# Patient Record
Sex: MALE | Race: WHITE | NOT HISPANIC OR LATINO | Employment: OTHER | URBAN - METROPOLITAN AREA
[De-identification: names, ages, dates, MRNs, and addresses within clinical notes are randomized per-mention and may not be internally consistent; named-entity substitution may affect disease eponyms.]

---

## 2019-05-21 ENCOUNTER — PATIENT OUTREACH (OUTPATIENT)
Dept: ADMINISTRATIVE | Facility: HOSPITAL | Age: 59
End: 2019-05-21

## 2019-05-21 NOTE — PROGRESS NOTES
Immunizations reviewed. Legacy reviewed. Attempted to contact patient. Left message on machine for return call.  Pre-visit chart review completed.

## 2019-05-28 ENCOUNTER — TELEPHONE (OUTPATIENT)
Dept: ADMINISTRATIVE | Facility: HOSPITAL | Age: 59
End: 2019-05-28

## 2019-05-28 DIAGNOSIS — Z12.5 SCREENING FOR MALIGNANT NEOPLASM OF PROSTATE: ICD-10-CM

## 2019-05-28 DIAGNOSIS — Z11.59 NEED FOR HEPATITIS C SCREENING TEST: ICD-10-CM

## 2019-05-28 DIAGNOSIS — Z13.29 SCREENING FOR THYROID DISORDER: ICD-10-CM

## 2019-05-28 DIAGNOSIS — R79.9 ABNORMAL BLOOD CHEMISTRY: ICD-10-CM

## 2019-05-28 DIAGNOSIS — F32.A DEPRESSION, UNSPECIFIED DEPRESSION TYPE: ICD-10-CM

## 2019-05-28 DIAGNOSIS — Z13.220 SCREENING FOR HYPERLIPIDEMIA: ICD-10-CM

## 2019-05-28 NOTE — TELEPHONE ENCOUNTER
Received return call from patient. Discussed overdue HM. Patient advised he had Colonoscopy done 5 years ago and it was normal at [facility]. Patient scheduled for fasting labs 05/29 as patient states he has not had labs done recently. Orders placed for full labs.

## 2019-06-04 ENCOUNTER — CLINICAL SUPPORT (OUTPATIENT)
Dept: PRIMARY CARE CLINIC | Facility: CLINIC | Age: 59
End: 2019-06-04
Payer: COMMERCIAL

## 2019-06-04 ENCOUNTER — OFFICE VISIT (OUTPATIENT)
Dept: PRIMARY CARE CLINIC | Facility: CLINIC | Age: 59
End: 2019-06-04
Payer: COMMERCIAL

## 2019-06-04 VITALS
DIASTOLIC BLOOD PRESSURE: 71 MMHG | OXYGEN SATURATION: 99 % | SYSTOLIC BLOOD PRESSURE: 116 MMHG | BODY MASS INDEX: 26.14 KG/M2 | RESPIRATION RATE: 18 BRPM | HEART RATE: 68 BPM | HEIGHT: 72 IN | WEIGHT: 193 LBS

## 2019-06-04 DIAGNOSIS — Z11.59 NEED FOR HEPATITIS C SCREENING TEST: ICD-10-CM

## 2019-06-04 DIAGNOSIS — I25.10 CORONARY ARTERY DISEASE, ANGINA PRESENCE UNSPECIFIED, UNSPECIFIED VESSEL OR LESION TYPE, UNSPECIFIED WHETHER NATIVE OR TRANSPLANTED HEART: ICD-10-CM

## 2019-06-04 DIAGNOSIS — K08.109 EDENTULOUS: ICD-10-CM

## 2019-06-04 DIAGNOSIS — R55 SYNCOPE, UNSPECIFIED SYNCOPE TYPE: ICD-10-CM

## 2019-06-04 DIAGNOSIS — E03.9 HYPOTHYROIDISM, UNSPECIFIED TYPE: ICD-10-CM

## 2019-06-04 DIAGNOSIS — Z72.0 TOBACCO ABUSE: ICD-10-CM

## 2019-06-04 DIAGNOSIS — L98.9 SKIN LESION: Primary | ICD-10-CM

## 2019-06-04 DIAGNOSIS — Z95.5 HISTORY OF HEART ARTERY STENT: ICD-10-CM

## 2019-06-04 LAB
ALBUMIN SERPL BCP-MCNC: 4.3 G/DL (ref 3.5–5.2)
ALP SERPL-CCNC: 179 U/L (ref 38–126)
ALT SERPL W/O P-5'-P-CCNC: 16 U/L (ref 17–63)
ANION GAP SERPL CALC-SCNC: 10 MMOL/L (ref 8–16)
AST SERPL-CCNC: 22 U/L (ref 15–41)
BASOPHILS # BLD AUTO: 0 K/UL (ref 0–0.2)
BASOPHILS NFR BLD: 0.8 % (ref 0–1.9)
BILIRUB SERPL-MCNC: 0.7 MG/DL (ref 0.3–1.2)
BILIRUB SERPL-MCNC: NORMAL MG/DL
BLOOD URINE, POC: NORMAL
BUN SERPL-MCNC: 23 MG/DL (ref 6–20)
CALCIUM SERPL-MCNC: 8.9 MG/DL (ref 8.6–10)
CHLORIDE SERPL-SCNC: 104 MMOL/L (ref 101–111)
CHOLEST SERPL-MCNC: 217 MG/DL (ref 80–200)
CHOLEST/HDLC SERPL: 2.8 {RATIO} (ref 2–5)
CO2 SERPL-SCNC: 26 MMOL/L (ref 23–29)
COLOR, POC UA: YELLOW
CREAT SERPL-MCNC: 0.8 MG/DL (ref 0.5–1.4)
DIFFERENTIAL METHOD: ABNORMAL
EOSINOPHIL # BLD AUTO: 0.1 K/UL (ref 0–0.5)
EOSINOPHIL NFR BLD: 2.5 % (ref 0–8)
ERYTHROCYTE [DISTWIDTH] IN BLOOD BY AUTOMATED COUNT: 16.6 % (ref 11.5–14.5)
ERYTHROCYTE [SEDIMENTATION RATE] IN BLOOD BY WESTERGREN METHOD: 20 MM/HR (ref 0–10)
EST. GFR  (AFRICAN AMERICAN): >60 ML/MIN/1.73 M^2
EST. GFR  (NON AFRICAN AMERICAN): >60 ML/MIN/1.73 M^2
GLUCOSE SERPL-MCNC: 89 MG/DL (ref 74–118)
GLUCOSE UR QL STRIP: NORMAL
HCT VFR BLD AUTO: 40.7 % (ref 40–54)
HDLC SERPL-MCNC: 77 MG/DL (ref 40–75)
HDLC SERPL: 35.5 % (ref 20–50)
HGB BLD-MCNC: 13.2 G/DL (ref 14–18)
KETONES UR QL STRIP: NORMAL
LDLC SERPL CALC-MCNC: 121 MG/DL
LEUKOCYTE ESTERASE URINE, POC: NORMAL
LYMPHOCYTES # BLD AUTO: 1.3 K/UL (ref 1–4.8)
LYMPHOCYTES NFR BLD: 22.1 % (ref 18–48)
MCH RBC QN AUTO: 29 PG (ref 27–31)
MCHC RBC AUTO-ENTMCNC: 32.5 G/DL (ref 32–36)
MCV RBC AUTO: 89 FL (ref 82–98)
MONOCYTES # BLD AUTO: 0.5 K/UL (ref 0.3–1)
MONOCYTES NFR BLD: 8.9 % (ref 4–15)
NEUTROPHILS # BLD AUTO: 3.9 K/UL (ref 1.8–7.7)
NEUTROPHILS NFR BLD: 65.7 % (ref 38–73)
NITRITE, POC UA: NORMAL
NONHDLC SERPL-MCNC: 140 MG/DL
PH, POC UA: 5
PLATELET # BLD AUTO: 295 K/UL (ref 150–350)
PMV BLD AUTO: 9.9 FL (ref 9.2–12.9)
POTASSIUM SERPL-SCNC: 4.4 MMOL/L (ref 3.5–5.1)
PROT SERPL-MCNC: 8.2 G/DL (ref 6–8.4)
PROTEIN, POC: NORMAL
RBC # BLD AUTO: 4.56 M/UL (ref 4.6–6.2)
SODIUM SERPL-SCNC: 140 MMOL/L (ref 136–145)
SPECIFIC GRAVITY, POC UA: 1.02
T4 FREE SERPL-MCNC: 0.73 NG/DL (ref 0.61–1.12)
TRIGL SERPL-MCNC: 94 MG/DL (ref 30–150)
TSH SERPL DL<=0.005 MIU/L-ACNC: 3.74 UIU/ML (ref 0.45–5.33)
UROBILINOGEN, POC UA: NORMAL
WBC # BLD AUTO: 5.9 K/UL (ref 3.9–12.7)

## 2019-06-04 PROCEDURE — 85651 RBC SED RATE NONAUTOMATED: CPT

## 2019-06-04 PROCEDURE — 3008F BODY MASS INDEX DOCD: CPT | Mod: CPTII,S$GLB,, | Performed by: FAMILY MEDICINE

## 2019-06-04 PROCEDURE — 81002 POCT URINE DIPSTICK WITHOUT MICROSCOPE: ICD-10-PCS | Mod: S$GLB,,, | Performed by: FAMILY MEDICINE

## 2019-06-04 PROCEDURE — 93010 ELECTROCARDIOGRAM REPORT: CPT | Mod: S$GLB,,, | Performed by: INTERNAL MEDICINE

## 2019-06-04 PROCEDURE — 85025 COMPLETE CBC W/AUTO DIFF WBC: CPT

## 2019-06-04 PROCEDURE — 93005 ELECTROCARDIOGRAM TRACING: CPT | Mod: S$GLB,,, | Performed by: FAMILY MEDICINE

## 2019-06-04 PROCEDURE — 99204 OFFICE O/P NEW MOD 45 MIN: CPT | Mod: 25,S$GLB,, | Performed by: FAMILY MEDICINE

## 2019-06-04 PROCEDURE — 86803 HEPATITIS C AB TEST: CPT

## 2019-06-04 PROCEDURE — 80061 LIPID PANEL: CPT

## 2019-06-04 PROCEDURE — 99999 PR PBB SHADOW E&M-EST. PATIENT-LVL IV: CPT | Mod: PBBFAC,,, | Performed by: FAMILY MEDICINE

## 2019-06-04 PROCEDURE — 81002 URINALYSIS NONAUTO W/O SCOPE: CPT | Mod: S$GLB,,, | Performed by: FAMILY MEDICINE

## 2019-06-04 PROCEDURE — 84439 ASSAY OF FREE THYROXINE: CPT

## 2019-06-04 PROCEDURE — 3008F PR BODY MASS INDEX (BMI) DOCUMENTED: ICD-10-PCS | Mod: CPTII,S$GLB,, | Performed by: FAMILY MEDICINE

## 2019-06-04 PROCEDURE — 93010 EKG 12-LEAD: ICD-10-PCS | Mod: S$GLB,,, | Performed by: INTERNAL MEDICINE

## 2019-06-04 PROCEDURE — 99204 PR OFFICE/OUTPT VISIT, NEW, LEVL IV, 45-59 MIN: ICD-10-PCS | Mod: 25,S$GLB,, | Performed by: FAMILY MEDICINE

## 2019-06-04 PROCEDURE — 80053 COMPREHEN METABOLIC PANEL: CPT

## 2019-06-04 PROCEDURE — 84443 ASSAY THYROID STIM HORMONE: CPT

## 2019-06-04 PROCEDURE — 99999 PR PBB SHADOW E&M-EST. PATIENT-LVL IV: ICD-10-PCS | Mod: PBBFAC,,, | Performed by: FAMILY MEDICINE

## 2019-06-04 PROCEDURE — 82306 VITAMIN D 25 HYDROXY: CPT

## 2019-06-04 PROCEDURE — 93005 EKG 12-LEAD: ICD-10-PCS | Mod: S$GLB,,, | Performed by: FAMILY MEDICINE

## 2019-06-04 RX ORDER — FERROUS SULFATE 325(65) MG
TABLET ORAL
Qty: 60 TABLET | Refills: 5 | Status: SHIPPED | OUTPATIENT
Start: 2019-06-04 | End: 2020-05-19 | Stop reason: SDUPTHER

## 2019-06-04 RX ORDER — LEVOTHYROXINE SODIUM 88 UG/1
88 TABLET ORAL
Qty: 30 TABLET | Refills: 5 | Status: SHIPPED | OUTPATIENT
Start: 2019-06-04 | End: 2020-05-19 | Stop reason: SDUPTHER

## 2019-06-04 RX ORDER — CITALOPRAM 20 MG/1
20 TABLET, FILM COATED ORAL DAILY
Qty: 30 TABLET | Refills: 5 | Status: SHIPPED | OUTPATIENT
Start: 2019-06-04 | End: 2019-09-05 | Stop reason: ALTCHOICE

## 2019-06-04 RX ORDER — CITALOPRAM 20 MG/1
20 TABLET, FILM COATED ORAL DAILY
COMMUNITY
Start: 2019-05-28 | End: 2019-06-04 | Stop reason: SDUPTHER

## 2019-06-04 RX ORDER — FERROUS SULFATE 325(65) MG
TABLET ORAL
COMMUNITY
Start: 2018-11-27 | End: 2019-06-04 | Stop reason: SDUPTHER

## 2019-06-04 RX ORDER — LEVOTHYROXINE SODIUM 88 UG/1
88 TABLET ORAL
COMMUNITY
Start: 2018-11-27 | End: 2019-06-04 | Stop reason: SDUPTHER
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
## 2019-06-05 LAB
25(OH)D3+25(OH)D2 SERPL-MCNC: 11 NG/ML (ref 30–96)
HCV AB SERPL QL IA: NEGATIVE

## 2019-06-11 DIAGNOSIS — R79.89 LOW VITAMIN D LEVEL: Primary | ICD-10-CM

## 2019-06-11 RX ORDER — ERGOCALCIFEROL 1.25 MG/1
50000 CAPSULE ORAL
Qty: 12 CAPSULE | Refills: 3 | Status: SHIPPED | OUTPATIENT
Start: 2019-06-11 | End: 2020-07-26

## 2019-06-11 NOTE — TELEPHONE ENCOUNTER
----- Message from Joselito Escamilla MD sent at 6/7/2019  7:49 AM CDT -----  Call tell Vit D low 11 needs be on vit d 50.000 units q week #12 3 refills sed rate 20 within normal limits chemistry alk-phos 179 may need B12 cholesterol 217 better of 180  better of 100 HDL 77 excellent CBCs CMP lipids T4 TSH all basically normal except for the vitamin D alk-phos in cholesterol needs to be on low-fat diet no need for specific treatment of cholesterol at this time due to excellent HDL but when start vitamin-D

## 2019-06-11 NOTE — TELEPHONE ENCOUNTER
Pt states he used to give himself B12 injections in his thigh muscle, so if he needs to start taking it again he can. Also, pended RX. Please advise

## 2019-07-30 ENCOUNTER — OFFICE VISIT (OUTPATIENT)
Dept: PRIMARY CARE CLINIC | Facility: CLINIC | Age: 59
End: 2019-07-30
Payer: COMMERCIAL

## 2019-07-30 VITALS
WEIGHT: 206 LBS | HEART RATE: 76 BPM | DIASTOLIC BLOOD PRESSURE: 79 MMHG | HEIGHT: 72 IN | OXYGEN SATURATION: 99 % | BODY MASS INDEX: 27.9 KG/M2 | TEMPERATURE: 98 F | RESPIRATION RATE: 19 BRPM | SYSTOLIC BLOOD PRESSURE: 121 MMHG

## 2019-07-30 DIAGNOSIS — S40.021A TRAUMATIC ECCHYMOSIS OF RIGHT UPPER ARM, INITIAL ENCOUNTER: ICD-10-CM

## 2019-07-30 DIAGNOSIS — E03.9 HYPOTHYROIDISM, UNSPECIFIED TYPE: ICD-10-CM

## 2019-07-30 DIAGNOSIS — K08.109 EDENTULOUS: Primary | ICD-10-CM

## 2019-07-30 DIAGNOSIS — L98.9 SKIN LESION: ICD-10-CM

## 2019-07-30 DIAGNOSIS — I25.10 CORONARY ARTERY DISEASE, ANGINA PRESENCE UNSPECIFIED, UNSPECIFIED VESSEL OR LESION TYPE, UNSPECIFIED WHETHER NATIVE OR TRANSPLANTED HEART: ICD-10-CM

## 2019-07-30 DIAGNOSIS — S42.034D CLOSED NONDISPLACED FRACTURE OF ACROMIAL END OF RIGHT CLAVICLE WITH ROUTINE HEALING: ICD-10-CM

## 2019-07-30 DIAGNOSIS — Z72.0 TOBACCO ABUSE: ICD-10-CM

## 2019-07-30 PROCEDURE — 99213 OFFICE O/P EST LOW 20 MIN: CPT | Mod: S$GLB,,, | Performed by: FAMILY MEDICINE

## 2019-07-30 PROCEDURE — 99999 PR PBB SHADOW E&M-EST. PATIENT-LVL IV: ICD-10-PCS | Mod: PBBFAC,,, | Performed by: FAMILY MEDICINE

## 2019-07-30 PROCEDURE — 99213 PR OFFICE/OUTPT VISIT, EST, LEVL III, 20-29 MIN: ICD-10-PCS | Mod: S$GLB,,, | Performed by: FAMILY MEDICINE

## 2019-07-30 PROCEDURE — 3008F BODY MASS INDEX DOCD: CPT | Mod: CPTII,S$GLB,, | Performed by: FAMILY MEDICINE

## 2019-07-30 PROCEDURE — 99999 PR PBB SHADOW E&M-EST. PATIENT-LVL IV: CPT | Mod: PBBFAC,,, | Performed by: FAMILY MEDICINE

## 2019-07-30 PROCEDURE — 3008F PR BODY MASS INDEX (BMI) DOCUMENTED: ICD-10-PCS | Mod: CPTII,S$GLB,, | Performed by: FAMILY MEDICINE
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
## 2019-08-01 ENCOUNTER — TELEPHONE (OUTPATIENT)
Dept: ORTHOPEDICS | Facility: CLINIC | Age: 59
End: 2019-08-01

## 2019-08-01 NOTE — TELEPHONE ENCOUNTER
Ortho Referral: [phone]  Appt scheduled tomorrow with MISTI Temple/  Ortho Clinic at  1:30pm with arrival at 1:15pm for R clavicle fracture ED follow up per Dr. Escamilla referral. Pt to maintain sling to RUE until seen in Ortho Clinic and is to obtain imaging disk from ED. SO, Marian,  confirms time and location of appt.

## 2019-08-02 ENCOUNTER — TELEPHONE (OUTPATIENT)
Dept: PRIMARY CARE CLINIC | Facility: CLINIC | Age: 59
End: 2019-08-02

## 2019-08-02 ENCOUNTER — OFFICE VISIT (OUTPATIENT)
Dept: ORTHOPEDICS | Facility: CLINIC | Age: 59
End: 2019-08-02
Payer: COMMERCIAL

## 2019-08-02 ENCOUNTER — HOSPITAL ENCOUNTER (OUTPATIENT)
Dept: RADIOLOGY | Facility: HOSPITAL | Age: 59
Discharge: HOME | End: 2019-08-02
Attending: NURSE PRACTITIONER
Payer: COMMERCIAL

## 2019-08-02 VITALS
SYSTOLIC BLOOD PRESSURE: 137 MMHG | DIASTOLIC BLOOD PRESSURE: 86 MMHG | WEIGHT: 205.94 LBS | HEART RATE: 59 BPM | BODY MASS INDEX: 27.93 KG/M2

## 2019-08-02 DIAGNOSIS — S42.001A CLOSED NONDISPLACED FRACTURE OF RIGHT CLAVICLE, UNSPECIFIED PART OF CLAVICLE, INITIAL ENCOUNTER: Primary | ICD-10-CM

## 2019-08-02 DIAGNOSIS — S42.124A CLOSED NONDISPLACED FRACTURE OF RIGHT ACROMIAL PROCESS, INITIAL ENCOUNTER: ICD-10-CM

## 2019-08-02 DIAGNOSIS — R52 PAIN: ICD-10-CM

## 2019-08-02 DIAGNOSIS — S42.001A CLOSED NONDISPLACED FRACTURE OF RIGHT CLAVICLE, UNSPECIFIED PART OF CLAVICLE, INITIAL ENCOUNTER: ICD-10-CM

## 2019-08-02 PROCEDURE — 3008F BODY MASS INDEX DOCD: CPT | Mod: CPTII,S$GLB,, | Performed by: NURSE PRACTITIONER

## 2019-08-02 PROCEDURE — 73000 X-RAY EXAM OF COLLAR BONE: CPT | Mod: 26,RT,, | Performed by: RADIOLOGY

## 2019-08-02 PROCEDURE — 99214 PR OFFICE/OUTPT VISIT, EST, LEVL IV, 30-39 MIN: ICD-10-PCS | Mod: S$GLB,,, | Performed by: NURSE PRACTITIONER

## 2019-08-02 PROCEDURE — 3008F PR BODY MASS INDEX (BMI) DOCUMENTED: ICD-10-PCS | Mod: CPTII,S$GLB,, | Performed by: NURSE PRACTITIONER

## 2019-08-02 PROCEDURE — 99999 PR PBB SHADOW E&M-EST. PATIENT-LVL III: CPT | Mod: PBBFAC,,, | Performed by: NURSE PRACTITIONER

## 2019-08-02 PROCEDURE — 99214 OFFICE O/P EST MOD 30 MIN: CPT | Mod: S$GLB,,, | Performed by: NURSE PRACTITIONER

## 2019-08-02 PROCEDURE — 99999 PR PBB SHADOW E&M-EST. PATIENT-LVL III: ICD-10-PCS | Mod: PBBFAC,,, | Performed by: NURSE PRACTITIONER

## 2019-08-02 PROCEDURE — 73000 X-RAY EXAM OF COLLAR BONE: CPT | Mod: TC,RT

## 2019-08-02 PROCEDURE — 73030 X-RAY EXAM OF SHOULDER: CPT | Mod: 26,RT,, | Performed by: RADIOLOGY

## 2019-08-02 PROCEDURE — 73030 XR SHOULDER COMPLETE 2 OR MORE VIEWS RIGHT: ICD-10-PCS | Mod: 26,RT,, | Performed by: RADIOLOGY

## 2019-08-02 PROCEDURE — 73030 X-RAY EXAM OF SHOULDER: CPT | Mod: TC,RT

## 2019-08-02 PROCEDURE — 73000 XR CLAVICLE RIGHT: ICD-10-PCS | Mod: 26,RT,, | Performed by: RADIOLOGY

## 2019-08-02 RX ORDER — IBUPROFEN 800 MG/1
800 TABLET ORAL 3 TIMES DAILY PRN
Qty: 30 TABLET | Refills: 0 | Status: SHIPPED | OUTPATIENT
Start: 2019-08-02 | End: 2019-08-23

## 2019-08-02 NOTE — TELEPHONE ENCOUNTER
Spoke to pt girlfriend. She states they can't get in touch with Dr. Douglas office. Told her to try to call again Monday, and if they don't answer we can see about referring the patient elsewhere. Verbalized understanding.

## 2019-08-02 NOTE — TELEPHONE ENCOUNTER
----- Message from Shana Crespo sent at 8/2/2019  4:20 PM CDT -----  Contact: Patient's gf Ms. Fonseca  Type: Needs Medical Advice    Who Called:  Patient  Best Call Back Number:[phone]  Additional Information: Marian is requesting a call back from Lupe in regards to her bf going to see a Neurologist at [place].Please call back and advise.

## 2019-08-02 NOTE — LETTER
August 2, 2019      Joselito Escamilla MD  [address] [place] [address]           [facility]  [address]  [facility] [address]  Phone: [phone]          Patient: Mauricio Molina   MR Number: [id number]   YOB: 1960   Date of Visit: 8/2/2019       Dear Dr. Joselito Escamilla:    Thank you for referring Mauricio Molina to me for evaluation. Attached you will find relevant portions of my assessment and plan of care.    If you have questions, please do not hesitate to call me. I look forward to following Mauricio Molina along with you.    Sincerely,    Samy Jarvis, MISTI    Enclosure  CC:  No Recipients    If you would like to receive this communication electronically, please contact [email or web address] or [phone] to request more information on [employer] access.    For providers and/or their staff who would like to refer a patient to [facility], please contact us through our one-stop-shop provider referral line, [facility], at [phone].    If you feel you have received this communication in error or would no longer like to receive these types of communications, please e-mail [email or web address]
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
## 2019-08-07 ENCOUNTER — TELEPHONE (OUTPATIENT)
Dept: PRIMARY CARE CLINIC | Facility: CLINIC | Age: 59
End: 2019-08-07

## 2019-08-07 NOTE — TELEPHONE ENCOUNTER
----- Message from Gonzalo Gramajo sent at 8/7/2019  3:57 PM CDT -----  Contact: pt's girlfriend Marian  Type: Needs Medical Advice    Who Called:  Marian    Best Call Back Number: [phone]  Additional Information: Wants to let Lupe know that the pt got an appointment with neurologist tomorrow at 1:00pm.

## 2019-08-08 ENCOUNTER — TELEPHONE (OUTPATIENT)
Dept: PRIMARY CARE CLINIC | Facility: CLINIC | Age: 59
End: 2019-08-08

## 2019-08-08 NOTE — TELEPHONE ENCOUNTER
Called patient notified that rx can not be refilled over the phone would need an appointment. Patient states that he seen Samy Jarvis NP and was told that he could not receive another rx for Norco yet. Notified patient we would not be able to give rx yet due to just filling rx on 07/30 states understanding states will also try pain management

## 2019-08-08 NOTE — TELEPHONE ENCOUNTER
----- Message from Lupe Chelimilind sent at 8/8/2019  2:22 PM CDT -----  Pt came from neurologist appt and want to inform Andi that the neurologist ordered him a 72hr ekg test and prescribed him three new meds one of them being cymbalta. Pt wants to know when should he come in for a f/u appt with Andi.

## 2019-08-08 NOTE — TELEPHONE ENCOUNTER
----- Message from Jennyfer Giordano sent at 8/8/2019  3:22 PM CDT -----  Contact: [phone]   Patient requesting a refill on norco.    Patient will be using   [facility]/pharmacy #[id number] - [place] - [address]  [address] [place] [address]  Phone: [phone] Fax: [phone]    Please call patient at [phone].     Thanks!

## 2019-08-08 NOTE — TELEPHONE ENCOUNTER
Spoke with patient in another encounter and notified that per Dr Escamilla last note f/u in 6 weeks states understanding

## 2019-08-23 ENCOUNTER — OFFICE VISIT (OUTPATIENT)
Dept: PRIMARY CARE CLINIC | Facility: CLINIC | Age: 59
End: 2019-08-23
Payer: COMMERCIAL

## 2019-08-23 VITALS
RESPIRATION RATE: 18 BRPM | HEIGHT: 72 IN | BODY MASS INDEX: 28.15 KG/M2 | WEIGHT: 207.81 LBS | SYSTOLIC BLOOD PRESSURE: 108 MMHG | OXYGEN SATURATION: 99 % | DIASTOLIC BLOOD PRESSURE: 75 MMHG | TEMPERATURE: 98 F | HEART RATE: 68 BPM

## 2019-08-23 DIAGNOSIS — S80.02XA CONTUSION OF LEFT KNEE, INITIAL ENCOUNTER: Primary | ICD-10-CM

## 2019-08-23 DIAGNOSIS — S42.034D CLOSED NONDISPLACED FRACTURE OF ACROMIAL END OF RIGHT CLAVICLE WITH ROUTINE HEALING: ICD-10-CM

## 2019-08-23 DIAGNOSIS — H61.22 IMPACTED CERUMEN OF LEFT EAR: ICD-10-CM

## 2019-08-23 PROCEDURE — 3008F PR BODY MASS INDEX (BMI) DOCUMENTED: ICD-10-PCS | Mod: CPTII,S$GLB,, | Performed by: INTERNAL MEDICINE

## 2019-08-23 PROCEDURE — 99999 PR PBB SHADOW E&M-EST. PATIENT-LVL III: CPT | Mod: PBBFAC,,, | Performed by: INTERNAL MEDICINE

## 2019-08-23 PROCEDURE — 99213 OFFICE O/P EST LOW 20 MIN: CPT | Mod: S$GLB,,, | Performed by: INTERNAL MEDICINE

## 2019-08-23 PROCEDURE — 99999 PR PBB SHADOW E&M-EST. PATIENT-LVL III: ICD-10-PCS | Mod: PBBFAC,,, | Performed by: INTERNAL MEDICINE

## 2019-08-23 PROCEDURE — 3008F BODY MASS INDEX DOCD: CPT | Mod: CPTII,S$GLB,, | Performed by: INTERNAL MEDICINE

## 2019-08-23 PROCEDURE — 99213 PR OFFICE/OUTPT VISIT, EST, LEVL III, 20-29 MIN: ICD-10-PCS | Mod: S$GLB,,, | Performed by: INTERNAL MEDICINE

## 2019-08-23 RX ORDER — HYDROCODONE BITARTRATE AND ACETAMINOPHEN 5; 325 MG/1; MG/1
1 TABLET ORAL EVERY 12 HOURS PRN
Qty: 15 TABLET | Refills: 0 | Status: SHIPPED | OUTPATIENT
Start: 2019-08-23 | End: 2019-11-19

## 2019-08-23 RX ORDER — IBUPROFEN 800 MG/1
800 TABLET ORAL 3 TIMES DAILY
Qty: 30 TABLET | Refills: 0 | Status: SHIPPED | OUTPATIENT
Start: 2019-08-23 | End: 2019-09-05 | Stop reason: SDUPTHER

## 2019-08-24 NOTE — PROGRESS NOTES
Subjective:       Patient ID: Mauricio Molina is a 59 y.o. male.    Chief Complaint: Fall (1 week ago) and Knee Pain (left knee pain and swelling since fall)    HPI  patient states the he fell at home when get off from a step landed on the left knee complained of pain and swelling and worsening when ambulating since then also fell about a month ago fracture right clavicle no loss of consciousness no dizziness deny any other injury no short of breath chest pain dyspnea with exertion still smoking a pack a day  Review of Systems    Objective:      Physical Exam   Constitutional: He is oriented to person, place, and time. He appears well-developed and well-nourished. No distress.   HENT:   Head: Normocephalic and atraumatic.   Right Ear: External ear normal.   Nose: Nose normal.   Mouth/Throat: Oropharynx is clear and moist. No oropharyngeal exudate.   Cerumen impaction of left ear canal   Eyes: Pupils are equal, round, and reactive to light. Conjunctivae and EOM are normal. Right eye exhibits no discharge. Left eye exhibits no discharge.   Neck: Normal range of motion. Neck supple. No thyromegaly present.   Cardiovascular: Normal rate, regular rhythm, normal heart sounds and intact distal pulses. Exam reveals no gallop and no friction rub.   No murmur heard.  Pulmonary/Chest: Effort normal and breath sounds normal. No respiratory distress. He has no wheezes. He has no rales. He exhibits no tenderness.   Abdominal: Soft. Bowel sounds are normal. He exhibits no distension. There is no tenderness. There is no rebound and no guarding.   Musculoskeletal: Normal range of motion. He exhibits tenderness (Tenderness with palpation around the kneecap but no obvious swelling erythema or heat). He exhibits no edema or deformity.   Lymphadenopathy:     He has no cervical adenopathy.   Neurological: He is alert and oriented to person, place, and time.   Skin: Skin is warm and dry. Capillary refill takes less than 2 seconds. No rash  noted. No erythema.   Psychiatric: He has a normal mood and affect. Judgment and thought content normal.   Nursing note and vitals reviewed.      Assessment:       1. Contusion of left knee, initial encounter    2. Closed nondisplaced fracture of acromial end of right clavicle with routine healing    3. Impacted cerumen of left ear        Plan:       Contusion of left knee, initial encounter  -     X-Ray Knee 3 View Left; Future; Expected date: 08/23/2019  -     HYDROcodone-acetaminophen (NORCO) 5-325 mg per tablet; Take 1 tablet by mouth every 12 (twelve) hours as needed.  Dispense: 15 tablet; Refill: 0  -     KNEE BRACE FOR HOME USE  -     ibuprofen (ADVIL,MOTRIN) 800 MG tablet; Take 1 tablet (800 mg total) by mouth 3 (three) times daily.  Dispense: 30 tablet; Refill: 0    Closed nondisplaced fracture of acromial end of right clavicle with routine healing    Impacted cerumen of left ear  -     Ear wax removal

## 2019-08-29 ENCOUNTER — TELEPHONE (OUTPATIENT)
Dept: PRIMARY CARE CLINIC | Facility: CLINIC | Age: 59
End: 2019-08-29

## 2019-08-29 NOTE — TELEPHONE ENCOUNTER
----- Message from Rosalina Edwards sent at 8/29/2019  1:57 PM CDT -----  Contact: Patient  Type:  Test Results    Who Called:  Mauricio patient  Name of Test (Lab/Mammo/Etc):  Xray  Date of Test:  08/23/2019  Ordering Provider:  Dr Escamilla  Where the test was performed:   [facility]  Best Call Back Number:  [phone]  Additional Information:  Please call him. Thanks.

## 2019-08-29 NOTE — TELEPHONE ENCOUNTER
----- Message from Ana Padilla sent at 8/29/2019  4:11 PM CDT -----  Contact: self   Patient is returning a phone call.  Who left a message for the patient: Maddie Marti MA  Does patient know what this is regarding:  Xray  Comments:

## 2019-09-05 ENCOUNTER — OFFICE VISIT (OUTPATIENT)
Dept: PRIMARY CARE CLINIC | Facility: CLINIC | Age: 59
End: 2019-09-05
Payer: COMMERCIAL

## 2019-09-05 VITALS
SYSTOLIC BLOOD PRESSURE: 125 MMHG | BODY MASS INDEX: 28.35 KG/M2 | HEIGHT: 72 IN | DIASTOLIC BLOOD PRESSURE: 78 MMHG | WEIGHT: 209.31 LBS | OXYGEN SATURATION: 100 % | HEART RATE: 78 BPM | TEMPERATURE: 98 F | RESPIRATION RATE: 18 BRPM

## 2019-09-05 DIAGNOSIS — S80.02XA CONTUSION OF LEFT KNEE, INITIAL ENCOUNTER: ICD-10-CM

## 2019-09-05 DIAGNOSIS — R55 SYNCOPE, UNSPECIFIED SYNCOPE TYPE: ICD-10-CM

## 2019-09-05 DIAGNOSIS — S86.912D KNEE STRAIN, LEFT, SUBSEQUENT ENCOUNTER: ICD-10-CM

## 2019-09-05 DIAGNOSIS — K08.109 EDENTULOUS: ICD-10-CM

## 2019-09-05 DIAGNOSIS — F32.A DEPRESSION, UNSPECIFIED DEPRESSION TYPE: ICD-10-CM

## 2019-09-05 DIAGNOSIS — E03.9 HYPOTHYROIDISM, UNSPECIFIED TYPE: ICD-10-CM

## 2019-09-05 DIAGNOSIS — Z95.5 HISTORY OF HEART ARTERY STENT: ICD-10-CM

## 2019-09-05 DIAGNOSIS — Z72.0 TOBACCO ABUSE: ICD-10-CM

## 2019-09-05 DIAGNOSIS — F41.9 ANXIETY: ICD-10-CM

## 2019-09-05 DIAGNOSIS — L98.9 SKIN LESION: ICD-10-CM

## 2019-09-05 DIAGNOSIS — S42.034D CLOSED NONDISPLACED FRACTURE OF ACROMIAL END OF RIGHT CLAVICLE WITH ROUTINE HEALING: Primary | ICD-10-CM

## 2019-09-05 DIAGNOSIS — E55.9 VITAMIN D DEFICIENCY: ICD-10-CM

## 2019-09-05 DIAGNOSIS — R19.5 LOOSE BOWEL MOVEMENT: ICD-10-CM

## 2019-09-05 DIAGNOSIS — I25.10 CORONARY ARTERY DISEASE, ANGINA PRESENCE UNSPECIFIED, UNSPECIFIED VESSEL OR LESION TYPE, UNSPECIFIED WHETHER NATIVE OR TRANSPLANTED HEART: ICD-10-CM

## 2019-09-05 DIAGNOSIS — Z98.84 HISTORY OF GASTRIC BYPASS: ICD-10-CM

## 2019-09-05 DIAGNOSIS — Z90.49 HISTORY OF CHOLECYSTECTOMY: ICD-10-CM

## 2019-09-05 PROBLEM — S40.021A TRAUMATIC ECCHYMOSIS OF RIGHT UPPER ARM: Status: RESOLVED | Noted: 2019-07-30 | Resolved: 2019-09-05

## 2019-09-05 PROCEDURE — 99214 OFFICE O/P EST MOD 30 MIN: CPT | Mod: S$GLB,,, | Performed by: FAMILY MEDICINE

## 2019-09-05 PROCEDURE — 99214 PR OFFICE/OUTPT VISIT, EST, LEVL IV, 30-39 MIN: ICD-10-PCS | Mod: S$GLB,,, | Performed by: FAMILY MEDICINE

## 2019-09-05 PROCEDURE — 99999 PR PBB SHADOW E&M-EST. PATIENT-LVL IV: ICD-10-PCS | Mod: PBBFAC,,, | Performed by: FAMILY MEDICINE

## 2019-09-05 PROCEDURE — 3008F PR BODY MASS INDEX (BMI) DOCUMENTED: ICD-10-PCS | Mod: CPTII,S$GLB,, | Performed by: FAMILY MEDICINE

## 2019-09-05 PROCEDURE — 99999 PR PBB SHADOW E&M-EST. PATIENT-LVL IV: CPT | Mod: PBBFAC,,, | Performed by: FAMILY MEDICINE

## 2019-09-05 PROCEDURE — 3008F BODY MASS INDEX DOCD: CPT | Mod: CPTII,S$GLB,, | Performed by: FAMILY MEDICINE

## 2019-09-05 RX ORDER — CLOPIDOGREL BISULFATE 75 MG/1
75 TABLET ORAL DAILY
COMMUNITY
End: 2020-05-19 | Stop reason: SDUPTHER

## 2019-09-05 RX ORDER — TRAMADOL HYDROCHLORIDE 50 MG/1
50 TABLET ORAL EVERY 6 HOURS PRN
Qty: 30 TABLET | Refills: 0 | Status: SHIPPED | OUTPATIENT
Start: 2019-09-05 | End: 2019-09-15

## 2019-09-05 RX ORDER — IBUPROFEN 800 MG/1
800 TABLET ORAL 3 TIMES DAILY
Qty: 30 TABLET | Refills: 0 | Status: SHIPPED | OUTPATIENT
Start: 2019-09-05 | End: 2019-11-19 | Stop reason: SDUPTHER

## 2019-09-05 RX ORDER — LOPERAMIDE HYDROCHLORIDE 2 MG/1
CAPSULE ORAL
Qty: 30 CAPSULE | Refills: 1 | Status: SHIPPED | OUTPATIENT
Start: 2019-09-05 | End: 2019-12-17 | Stop reason: SDUPTHER
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
## 2019-10-07 ENCOUNTER — PATIENT OUTREACH (OUTPATIENT)
Dept: ADMINISTRATIVE | Facility: OTHER | Age: 59
End: 2019-10-07

## 2019-10-10 ENCOUNTER — OFFICE VISIT (OUTPATIENT)
Dept: ORTHOPEDICS | Facility: CLINIC | Age: 59
End: 2019-10-10
Payer: COMMERCIAL

## 2019-10-10 VITALS
BODY MASS INDEX: 29.71 KG/M2 | DIASTOLIC BLOOD PRESSURE: 89 MMHG | HEART RATE: 84 BPM | SYSTOLIC BLOOD PRESSURE: 132 MMHG | WEIGHT: 219 LBS | RESPIRATION RATE: 16 BRPM

## 2019-10-10 DIAGNOSIS — M25.562 ACUTE PAIN OF LEFT KNEE: Primary | ICD-10-CM

## 2019-10-10 PROCEDURE — 99214 PR OFFICE/OUTPT VISIT, EST, LEVL IV, 30-39 MIN: ICD-10-PCS | Mod: 25,S$GLB,, | Performed by: ORTHOPAEDIC SURGERY

## 2019-10-10 PROCEDURE — 99214 OFFICE O/P EST MOD 30 MIN: CPT | Mod: 25,S$GLB,, | Performed by: ORTHOPAEDIC SURGERY

## 2019-10-10 PROCEDURE — 3008F PR BODY MASS INDEX (BMI) DOCUMENTED: ICD-10-PCS | Mod: CPTII,S$GLB,, | Performed by: ORTHOPAEDIC SURGERY

## 2019-10-10 PROCEDURE — 99999 PR PBB SHADOW E&M-EST. PATIENT-LVL IV: ICD-10-PCS | Mod: PBBFAC,,, | Performed by: ORTHOPAEDIC SURGERY

## 2019-10-10 PROCEDURE — 3008F BODY MASS INDEX DOCD: CPT | Mod: CPTII,S$GLB,, | Performed by: ORTHOPAEDIC SURGERY

## 2019-10-10 PROCEDURE — 20610 LARGE JOINT ASPIRATION/INJECTION: L KNEE: ICD-10-PCS | Mod: LT,S$GLB,, | Performed by: ORTHOPAEDIC SURGERY

## 2019-10-10 PROCEDURE — 99999 PR PBB SHADOW E&M-EST. PATIENT-LVL IV: CPT | Mod: PBBFAC,,, | Performed by: ORTHOPAEDIC SURGERY

## 2019-10-10 PROCEDURE — 20610 DRAIN/INJ JOINT/BURSA W/O US: CPT | Mod: LT,S$GLB,, | Performed by: ORTHOPAEDIC SURGERY

## 2019-10-10 RX ORDER — CITALOPRAM 20 MG/1
TABLET, FILM COATED ORAL
COMMUNITY
End: 2019-10-10 | Stop reason: SINTOL

## 2019-10-10 RX ORDER — CITALOPRAM 20 MG/1
TABLET, FILM COATED ORAL
COMMUNITY
Start: 2019-09-21 | End: 2019-12-17 | Stop reason: SDUPTHER

## 2019-10-10 RX ORDER — DULOXETIN HYDROCHLORIDE 30 MG/1
CAPSULE, DELAYED RELEASE ORAL
COMMUNITY
Start: 2019-09-18 | End: 2019-10-10 | Stop reason: SINTOL

## 2019-10-10 RX ORDER — SILDENAFIL 50 MG/1
TABLET, FILM COATED ORAL
COMMUNITY
Start: 2018-11-27 | End: 2019-11-19

## 2019-10-10 RX ORDER — MELOXICAM 15 MG/1
15 TABLET ORAL DAILY
Qty: 30 TABLET | Refills: 2 | Status: SHIPPED | OUTPATIENT
Start: 2019-10-10 | End: 2019-11-19

## 2019-10-10 RX ORDER — TRIAMCINOLONE ACETONIDE 40 MG/ML
40 INJECTION, SUSPENSION INTRA-ARTICULAR; INTRAMUSCULAR
Status: DISCONTINUED | OUTPATIENT
Start: 2019-10-10 | End: 2019-10-10 | Stop reason: HOSPADM

## 2019-10-10 RX ADMIN — TRIAMCINOLONE ACETONIDE 40 MG: 40 INJECTION, SUSPENSION INTRA-ARTICULAR; INTRAMUSCULAR at 02:10

## 2019-10-10 NOTE — PROGRESS NOTES
Subjective:      Patient ID: Mauricio Molina is a 59 y.o. male.    Chief Complaint: Pain of the Left Knee    Patient is a 59-year-old male who presents with acute onset of left knee pain status post fall from standing height.  Reports having a twisting type motion applied to his knee during the fall.  Knee experience swelling and disability of function after the fall and remained that way for several days.  Since the fall he reports mechanical type symptoms with no instability in the knee. Ambulates independently.  Has tried activity modification over-the-counter medications with little relief of his symptoms.  Positive back pain. No radiculopathy.      Review of Systems   Constitution: Negative for chills and fever.   Cardiovascular: Negative for chest pain and syncope.   Respiratory: Negative for cough and shortness of breath.    Musculoskeletal: Positive for back pain, joint pain and joint swelling.   Gastrointestinal: Negative for nausea and vomiting.   Neurological: Negative for brief paralysis and seizures.   Psychiatric/Behavioral: Negative for altered mental status and hallucinations.         Objective:            General    Constitutional: He is oriented to person, place, and time. He appears well-developed and well-nourished.   HENT:   Head: Normocephalic and atraumatic.   Eyes: Conjunctivae are normal.   Neck: Normal range of motion.   Cardiovascular: Intact distal pulses.    Pulmonary/Chest: Effort normal.   Neurological: He is alert and oriented to person, place, and time.   Psychiatric: He has a normal mood and affect. His behavior is normal. Judgment and thought content normal.     General Musculoskeletal Exam   Gait: antalgic         Left Knee Exam     Inspection   Erythema: absent  Scars: absent  Swelling: absent  Effusion: present  Deformity: absent  Bruising: absent    Tenderness   The patient tender to palpation of the medial joint line.    Range of Motion   Extension: 0   Flexion: 120     Tests    Meniscus   Iliana:  Medial - positive Lateral - positive  Stability Lachman: normal (-1 to 2mm) PCL-Posterior Drawer: normal (0 to 2mm)  MCL - Valgus: normal (0 to 2mm)  LCL - Varus: normal (0 to 2mm)  Patella   Patellar Grind: positive    Other   Sensation: normal    Muscle Strength   Left Lower Extremity   Hip Abduction: 5/5   Quadriceps:  5/5   Hamstrin/5     Vascular Exam       Left Pulses  Dorsalis Pedis:      2+  Posterior Tibial:      2+            Radiographs of the left knee demonstrate mild tricompartmental degeneration with no deformity.  There is no fracture/dislocation        Assessment:       Encounter Diagnosis   Name Primary?    Acute pain of left knee Yes          Plan:       Mauricio was seen today for pain.    Diagnoses and all orders for this visit:    Acute pain of left knee  -     Large Joint Aspiration/Injection: L knee  -     Ambulatory Referral to Physical/Occupational Therapy  -     MRI Knee Without Contrast Left; Future    Other orders  -     meloxicam (MOBIC) 15 MG tablet; Take 1 tablet (15 mg total) by mouth once daily. Do not take with any other NSAIDs  Take with a meal      Inject left knee  MRI left knee pain  Physical therapy referral for outpatient therapy  NSAIDs p.r.n., screw given the patient  Activity as tolerated  Return to clinic in 3 months

## 2019-10-10 NOTE — LETTER
October 10, 2019      Joselito Escamilla MD  [address] [place] [address]           [facility]  [address] [place], [facility] [address]  [place] [address]  Phone: [phone]  Fax: [phone]          Patient: Mauricio Molina   MR Number: [id number]   YOB: 1960   Date of Visit: 10/10/2019       Dear Dr. Joselito Escamilla:    Thank you for referring Mauricio Molina to me for evaluation. Attached you will find relevant portions of my assessment and plan of care.    If you have questions, please do not hesitate to call me. I look forward to following Mauricio Molina along with you.    Sincerely,    Ehsan Wood MD    Enclosure  CC:  No Recipients    If you would like to receive this communication electronically, please contact [email or web address] or [phone] to request more information on [employer] access.    For providers and/or their staff who would like to refer a patient to [facility], please contact us through our one-stop-shop provider referral line, [facility], at [phone].    If you feel you have received this communication in error or would no longer like to receive these types of communications, please e-mail [email or web address]

## 2019-10-10 NOTE — PROCEDURES
Large Joint Aspiration/Injection: L knee  Date/Time: 10/10/2019 2:45 PM  Performed by: Ehsan Wood MD  Authorized by: Ehsan Wood MD     Consent Done?:  Yes (Verbal)  Indications:  Pain  Procedure site marked: Yes    Timeout: Prior to procedure the correct patient, procedure, and site was verified    Anesthesia  Local anesthesia used  Anesthetic: topical anesthetic    Location:  Knee  Site:  L knee  Prep: Patient was prepped and draped in usual sterile fashion    Needle size:  21 G  Ultrasonic Guidance for needle placement: No  Approach:  Anterolateral  Medications:  40 mg triamcinolone acetonide 40 mg/mL  Patient tolerance:  Patient tolerated the procedure well with no immediate complications

## 2019-10-11 ENCOUNTER — CLINICAL SUPPORT (OUTPATIENT)
Dept: PRIMARY CARE CLINIC | Facility: CLINIC | Age: 59
End: 2019-10-11
Payer: COMMERCIAL

## 2019-10-11 DIAGNOSIS — Z23 NEED FOR PROPHYLACTIC VACCINATION AND INOCULATION AGAINST INFLUENZA: Primary | ICD-10-CM

## 2019-10-11 DIAGNOSIS — Z23 NEED FOR VACCINATION: ICD-10-CM

## 2019-10-11 PROCEDURE — 90471 IMMUNIZATION ADMIN: CPT | Mod: S$GLB,,, | Performed by: FAMILY MEDICINE

## 2019-10-11 PROCEDURE — 90714 TD VACC NO PRESV 7 YRS+ IM: CPT | Mod: S$GLB,,, | Performed by: FAMILY MEDICINE

## 2019-10-11 PROCEDURE — 90732 PPSV23 VACC 2 YRS+ SUBQ/IM: CPT | Mod: S$GLB,,, | Performed by: FAMILY MEDICINE

## 2019-10-11 PROCEDURE — 90472 IMMUNIZATION ADMIN EACH ADD: CPT | Mod: S$GLB,,, | Performed by: FAMILY MEDICINE

## 2019-10-11 PROCEDURE — 90686 IIV4 VACC NO PRSV 0.5 ML IM: CPT | Mod: S$GLB,,, | Performed by: FAMILY MEDICINE

## 2019-10-11 PROCEDURE — 90471 FLU VACCINE (QUAD) GREATER THAN OR EQUAL TO 3YO PRESERVATIVE FREE IM: ICD-10-PCS | Mod: S$GLB,,, | Performed by: FAMILY MEDICINE

## 2019-10-11 PROCEDURE — 90686 FLU VACCINE (QUAD) GREATER THAN OR EQUAL TO 3YO PRESERVATIVE FREE IM: ICD-10-PCS | Mod: S$GLB,,, | Performed by: FAMILY MEDICINE

## 2019-10-11 PROCEDURE — 90472 TD VACCINE GREATER THAN OR EQUAL TO 7YO PRESERVATIVE FREE IM: ICD-10-PCS | Mod: S$GLB,,, | Performed by: FAMILY MEDICINE

## 2019-10-11 PROCEDURE — 90732 PNEUMOCOCCAL POLYSACCHARIDE VACCINE 23-VALENT =>2YO SQ IM: ICD-10-PCS | Mod: S$GLB,,, | Performed by: FAMILY MEDICINE

## 2019-10-11 PROCEDURE — 90714 TD VACCINE GREATER THAN OR EQUAL TO 7YO PRESERVATIVE FREE IM: ICD-10-PCS | Mod: S$GLB,,, | Performed by: FAMILY MEDICINE

## 2019-10-11 NOTE — PROGRESS NOTES
Verified pt ID using name and . NKDA. Administered immunizations per physician order, see chart using aseptic technique. Aspirated and no blood return noted. Pt tolerated well with no adverse reactions noted.

## 2019-10-14 ENCOUNTER — TELEPHONE (OUTPATIENT)
Dept: PRIMARY CARE CLINIC | Facility: CLINIC | Age: 59
End: 2019-10-14

## 2019-10-14 NOTE — TELEPHONE ENCOUNTER
----- Message from Paulette Lopez sent at 10/14/2019  2:11 PM CDT -----  Contact: patient [phone]  Patient went to the ER yesterday with c/o flu like sx body aches/ feverish.  Pt had flu and pneumonia shots as well as tetanus shot and had an allergic reaction per the ER doctor. This started with swelling and redness, hives,  warm to the touch and is very tender. Patient is having sweats alternating with being very cold since last Wed.  Pt is still having pain.    [id number] - [place], [place] - [address] [phone]

## 2019-10-14 NOTE — TELEPHONE ENCOUNTER
"Verbalized to patient that he received the pneumovax 23 and tetanus vaccine in left deltoid. Verbalized to patient to move that arm, apply ice and to take the Norco as prescribed by the ER. Patient also reports chills and sweats.Patient verbalized "I don't know if I'll get that pneumonia shot again." Instructed patient to call me on Wednesday if his symptoms are not better.   "

## 2019-11-19 ENCOUNTER — OFFICE VISIT (OUTPATIENT)
Dept: PRIMARY CARE CLINIC | Facility: CLINIC | Age: 59
End: 2019-11-19
Payer: COMMERCIAL

## 2019-11-19 VITALS
WEIGHT: 217 LBS | HEART RATE: 66 BPM | SYSTOLIC BLOOD PRESSURE: 124 MMHG | BODY MASS INDEX: 29.39 KG/M2 | RESPIRATION RATE: 18 BRPM | OXYGEN SATURATION: 99 % | HEIGHT: 72 IN | DIASTOLIC BLOOD PRESSURE: 80 MMHG | TEMPERATURE: 98 F

## 2019-11-19 DIAGNOSIS — S39.012A LUMBOSACRAL STRAIN, INITIAL ENCOUNTER: ICD-10-CM

## 2019-11-19 DIAGNOSIS — I25.10 CORONARY ARTERY DISEASE, ANGINA PRESENCE UNSPECIFIED, UNSPECIFIED VESSEL OR LESION TYPE, UNSPECIFIED WHETHER NATIVE OR TRANSPLANTED HEART: ICD-10-CM

## 2019-11-19 DIAGNOSIS — E55.9 VITAMIN D DEFICIENCY: ICD-10-CM

## 2019-11-19 DIAGNOSIS — F41.9 ANXIETY: Primary | ICD-10-CM

## 2019-11-19 DIAGNOSIS — K08.109 EDENTULOUS: ICD-10-CM

## 2019-11-19 DIAGNOSIS — Z98.84 HISTORY OF GASTRIC BYPASS: ICD-10-CM

## 2019-11-19 DIAGNOSIS — Z72.0 TOBACCO ABUSE: ICD-10-CM

## 2019-11-19 DIAGNOSIS — S80.02XA CONTUSION OF LEFT KNEE, INITIAL ENCOUNTER: ICD-10-CM

## 2019-11-19 DIAGNOSIS — Z90.49 HISTORY OF CHOLECYSTECTOMY: ICD-10-CM

## 2019-11-19 DIAGNOSIS — E03.9 HYPOTHYROIDISM, UNSPECIFIED TYPE: ICD-10-CM

## 2019-11-19 DIAGNOSIS — Z95.5 HISTORY OF HEART ARTERY STENT: ICD-10-CM

## 2019-11-19 DIAGNOSIS — Z98.1 HISTORY OF LUMBAR FUSION: ICD-10-CM

## 2019-11-19 DIAGNOSIS — F32.A DEPRESSION, UNSPECIFIED DEPRESSION TYPE: ICD-10-CM

## 2019-11-19 PROCEDURE — 99214 OFFICE O/P EST MOD 30 MIN: CPT | Mod: 25,S$GLB,, | Performed by: FAMILY MEDICINE

## 2019-11-19 PROCEDURE — 99214 PR OFFICE/OUTPT VISIT, EST, LEVL IV, 30-39 MIN: ICD-10-PCS | Mod: 25,S$GLB,, | Performed by: FAMILY MEDICINE

## 2019-11-19 PROCEDURE — 99999 PR PBB SHADOW E&M-EST. PATIENT-LVL IV: ICD-10-PCS | Mod: PBBFAC,,, | Performed by: FAMILY MEDICINE

## 2019-11-19 PROCEDURE — 96372 THER/PROPH/DIAG INJ SC/IM: CPT | Mod: S$GLB,,, | Performed by: FAMILY MEDICINE

## 2019-11-19 PROCEDURE — 3008F BODY MASS INDEX DOCD: CPT | Mod: CPTII,S$GLB,, | Performed by: FAMILY MEDICINE

## 2019-11-19 PROCEDURE — 99999 PR PBB SHADOW E&M-EST. PATIENT-LVL IV: CPT | Mod: PBBFAC,,, | Performed by: FAMILY MEDICINE

## 2019-11-19 PROCEDURE — 96372 PR INJECTION,THERAP/PROPH/DIAG2ST, IM OR SUBCUT: ICD-10-PCS | Mod: S$GLB,,, | Performed by: FAMILY MEDICINE

## 2019-11-19 PROCEDURE — 3008F PR BODY MASS INDEX (BMI) DOCUMENTED: ICD-10-PCS | Mod: CPTII,S$GLB,, | Performed by: FAMILY MEDICINE

## 2019-11-19 RX ORDER — METHYLPREDNISOLONE 4 MG/1
TABLET ORAL
Qty: 1 PACKAGE | Refills: 0 | Status: SHIPPED | OUTPATIENT
Start: 2019-11-19 | End: 2020-05-19

## 2019-11-19 RX ORDER — IBUPROFEN 800 MG/1
800 TABLET ORAL 3 TIMES DAILY
Qty: 30 TABLET | Refills: 0 | Status: SHIPPED | OUTPATIENT
Start: 2019-11-19 | End: 2019-12-17 | Stop reason: SDUPTHER

## 2019-11-19 RX ORDER — CYCLOBENZAPRINE HCL 10 MG
10 TABLET ORAL 3 TIMES DAILY PRN
Qty: 30 TABLET | Refills: 5 | Status: SHIPPED | OUTPATIENT
Start: 2019-11-19 | End: 2020-03-16

## 2019-11-19 RX ORDER — TRAMADOL HYDROCHLORIDE 50 MG/1
50 TABLET ORAL EVERY 6 HOURS PRN
Qty: 30 TABLET | Refills: 0 | Status: SHIPPED | OUTPATIENT
Start: 2019-11-19 | End: 2019-11-29

## 2019-11-19 RX ORDER — TRIAMCINOLONE ACETONIDE 40 MG/ML
40 INJECTION, SUSPENSION INTRA-ARTICULAR; INTRAMUSCULAR ONCE
Status: COMPLETED | OUTPATIENT
Start: 2019-11-19 | End: 2019-11-19

## 2019-11-19 RX ADMIN — TRIAMCINOLONE ACETONIDE 40 MG: 40 INJECTION, SUSPENSION INTRA-ARTICULAR; INTRAMUSCULAR at 03:11
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
## 2019-11-19 NOTE — PROGRESS NOTES
Verified pt ID using name and . NKDA. Administered Kenalog 40 mg in left VG per physician order using aseptic technique. Aspirated and no blood return noted. Pt tolerated well with no adverse reactions noted.

## 2019-11-26 ENCOUNTER — TELEPHONE (OUTPATIENT)
Dept: PRIMARY CARE CLINIC | Facility: CLINIC | Age: 59
End: 2019-11-26

## 2019-11-26 NOTE — TELEPHONE ENCOUNTER
----- Message from Joselito Escamilla MD sent at 11/24/2019  3:40 PM CST -----  Xray LS spine arthritis with some facet narrowing and L3-4 disc space narrowing. Conservative tx --if not better 6-12 weeks needs MRI if desires can get physical therapy

## 2019-12-17 ENCOUNTER — OFFICE VISIT (OUTPATIENT)
Dept: PRIMARY CARE CLINIC | Facility: CLINIC | Age: 59
End: 2019-12-17
Payer: COMMERCIAL

## 2019-12-17 VITALS
SYSTOLIC BLOOD PRESSURE: 124 MMHG | OXYGEN SATURATION: 99 % | WEIGHT: 226 LBS | BODY MASS INDEX: 30.61 KG/M2 | HEART RATE: 75 BPM | HEIGHT: 72 IN | RESPIRATION RATE: 18 BRPM | TEMPERATURE: 98 F | DIASTOLIC BLOOD PRESSURE: 82 MMHG

## 2019-12-17 DIAGNOSIS — K08.109 EDENTULOUS: ICD-10-CM

## 2019-12-17 DIAGNOSIS — Z98.84 HISTORY OF GASTRIC BYPASS: ICD-10-CM

## 2019-12-17 DIAGNOSIS — Z72.0 TOBACCO ABUSE: ICD-10-CM

## 2019-12-17 DIAGNOSIS — S80.02XA CONTUSION OF LEFT KNEE, INITIAL ENCOUNTER: ICD-10-CM

## 2019-12-17 DIAGNOSIS — I25.10 CORONARY ARTERY DISEASE, ANGINA PRESENCE UNSPECIFIED, UNSPECIFIED VESSEL OR LESION TYPE, UNSPECIFIED WHETHER NATIVE OR TRANSPLANTED HEART: Primary | ICD-10-CM

## 2019-12-17 DIAGNOSIS — Z95.5 HISTORY OF HEART ARTERY STENT: ICD-10-CM

## 2019-12-17 DIAGNOSIS — L98.9 SKIN LESION: ICD-10-CM

## 2019-12-17 DIAGNOSIS — E03.9 HYPOTHYROIDISM, UNSPECIFIED TYPE: ICD-10-CM

## 2019-12-17 DIAGNOSIS — F41.9 ANXIETY: ICD-10-CM

## 2019-12-17 DIAGNOSIS — Z98.1 HISTORY OF LUMBAR FUSION: ICD-10-CM

## 2019-12-17 DIAGNOSIS — R93.89 ABNORMAL CHEST X-RAY: ICD-10-CM

## 2019-12-17 DIAGNOSIS — Z90.49 HISTORY OF CHOLECYSTECTOMY: ICD-10-CM

## 2019-12-17 DIAGNOSIS — F32.A DEPRESSION, UNSPECIFIED DEPRESSION TYPE: ICD-10-CM

## 2019-12-17 DIAGNOSIS — E55.9 VITAMIN D DEFICIENCY: ICD-10-CM

## 2019-12-17 PROCEDURE — 99999 PR PBB SHADOW E&M-EST. PATIENT-LVL III: CPT | Mod: PBBFAC,,, | Performed by: FAMILY MEDICINE

## 2019-12-17 PROCEDURE — 99213 PR OFFICE/OUTPT VISIT, EST, LEVL III, 20-29 MIN: ICD-10-PCS | Mod: S$GLB,,, | Performed by: FAMILY MEDICINE

## 2019-12-17 PROCEDURE — 3008F BODY MASS INDEX DOCD: CPT | Mod: CPTII,S$GLB,, | Performed by: FAMILY MEDICINE

## 2019-12-17 PROCEDURE — 99999 PR PBB SHADOW E&M-EST. PATIENT-LVL III: ICD-10-PCS | Mod: PBBFAC,,, | Performed by: FAMILY MEDICINE

## 2019-12-17 PROCEDURE — 3008F PR BODY MASS INDEX (BMI) DOCUMENTED: ICD-10-PCS | Mod: CPTII,S$GLB,, | Performed by: FAMILY MEDICINE

## 2019-12-17 PROCEDURE — 99213 OFFICE O/P EST LOW 20 MIN: CPT | Mod: S$GLB,,, | Performed by: FAMILY MEDICINE

## 2019-12-17 RX ORDER — CITALOPRAM 20 MG/1
20 TABLET, FILM COATED ORAL DAILY
Qty: 90 TABLET | Refills: 1 | Status: SHIPPED | OUTPATIENT
Start: 2019-12-17 | End: 2020-05-19 | Stop reason: SDUPTHER

## 2019-12-17 RX ORDER — GABAPENTIN 300 MG/1
300 CAPSULE ORAL 3 TIMES DAILY
Qty: 90 CAPSULE | Refills: 2 | Status: SHIPPED | OUTPATIENT
Start: 2019-12-17 | End: 2020-05-19

## 2019-12-17 RX ORDER — TRAMADOL HYDROCHLORIDE 50 MG/1
50 TABLET ORAL EVERY 6 HOURS PRN
Qty: 30 TABLET | Refills: 0 | Status: SHIPPED | OUTPATIENT
Start: 2019-12-17 | End: 2019-12-27

## 2019-12-17 RX ORDER — LOPERAMIDE HYDROCHLORIDE 2 MG/1
CAPSULE ORAL
Qty: 30 CAPSULE | Refills: 1 | Status: SHIPPED | OUTPATIENT
Start: 2019-12-17 | End: 2020-05-19 | Stop reason: SDUPTHER

## 2019-12-17 RX ORDER — IBUPROFEN 800 MG/1
800 TABLET ORAL 3 TIMES DAILY
Qty: 30 TABLET | Refills: 5 | Status: SHIPPED | OUTPATIENT
Start: 2019-12-17 | End: 2020-05-19 | Stop reason: SDUPTHER
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
## 2019-12-23 RX ORDER — CITALOPRAM 20 MG/1
TABLET, FILM COATED ORAL
Qty: 90 TABLET | Refills: 1 | Status: SHIPPED | OUTPATIENT
Start: 2019-12-23 | End: 2020-05-19

## 2019-12-25 PROBLEM — E78.5 BORDERLINE HYPERLIPIDEMIA: Status: ACTIVE | Noted: 2019-12-25

## 2019-12-25 PROBLEM — R76.8 HEPATITIS C ANTIBODY TEST POSITIVE: Status: ACTIVE | Noted: 2019-12-25

## 2020-01-02 ENCOUNTER — OFFICE VISIT (OUTPATIENT)
Dept: PRIMARY CARE CLINIC | Facility: CLINIC | Age: 60
End: 2020-01-02
Payer: MEDICARE

## 2020-01-02 VITALS
SYSTOLIC BLOOD PRESSURE: 118 MMHG | RESPIRATION RATE: 18 BRPM | WEIGHT: 230 LBS | HEART RATE: 78 BPM | BODY MASS INDEX: 31.15 KG/M2 | TEMPERATURE: 98 F | HEIGHT: 72 IN | OXYGEN SATURATION: 98 % | DIASTOLIC BLOOD PRESSURE: 68 MMHG

## 2020-01-02 DIAGNOSIS — M17.11 OSTEOARTHRITIS OF RIGHT KNEE, UNSPECIFIED OSTEOARTHRITIS TYPE: ICD-10-CM

## 2020-01-02 DIAGNOSIS — G89.4 CHRONIC PAIN SYNDROME: ICD-10-CM

## 2020-01-02 DIAGNOSIS — Z90.49 HISTORY OF CHOLECYSTECTOMY: ICD-10-CM

## 2020-01-02 DIAGNOSIS — S86.912D KNEE STRAIN, LEFT, SUBSEQUENT ENCOUNTER: ICD-10-CM

## 2020-01-02 DIAGNOSIS — K08.109 EDENTULOUS: ICD-10-CM

## 2020-01-02 DIAGNOSIS — Z72.0 TOBACCO ABUSE: ICD-10-CM

## 2020-01-02 DIAGNOSIS — R76.8 HEPATITIS C ANTIBODY TEST POSITIVE: ICD-10-CM

## 2020-01-02 DIAGNOSIS — F41.9 ANXIETY: ICD-10-CM

## 2020-01-02 DIAGNOSIS — E55.9 VITAMIN D DEFICIENCY: ICD-10-CM

## 2020-01-02 DIAGNOSIS — R74.8 ELEVATED ALKALINE PHOSPHATASE LEVEL: ICD-10-CM

## 2020-01-02 DIAGNOSIS — F32.A DEPRESSION, UNSPECIFIED DEPRESSION TYPE: ICD-10-CM

## 2020-01-02 DIAGNOSIS — Z98.1 HISTORY OF LUMBAR FUSION: Primary | ICD-10-CM

## 2020-01-02 DIAGNOSIS — J84.10 GRANULOMATOUS LUNG DISEASE: ICD-10-CM

## 2020-01-02 DIAGNOSIS — E03.9 HYPOTHYROIDISM, UNSPECIFIED TYPE: ICD-10-CM

## 2020-01-02 PROCEDURE — 99999 PR PBB SHADOW E&M-EST. PATIENT-LVL V: CPT | Mod: PBBFAC,,, | Performed by: FAMILY MEDICINE

## 2020-01-02 PROCEDURE — 99213 OFFICE O/P EST LOW 20 MIN: CPT | Mod: S$GLB,,, | Performed by: FAMILY MEDICINE

## 2020-01-02 PROCEDURE — 99213 PR OFFICE/OUTPT VISIT, EST, LEVL III, 20-29 MIN: ICD-10-PCS | Mod: S$GLB,,, | Performed by: FAMILY MEDICINE

## 2020-01-02 PROCEDURE — 99999 PR PBB SHADOW E&M-EST. PATIENT-LVL V: ICD-10-PCS | Mod: PBBFAC,,, | Performed by: FAMILY MEDICINE

## 2020-01-02 RX ORDER — TRAMADOL HYDROCHLORIDE 50 MG/1
50 TABLET ORAL EVERY 6 HOURS PRN
Qty: 30 TABLET | Refills: 0 | Status: SHIPPED | OUTPATIENT
Start: 2020-01-02 | End: 2020-01-12

## 2020-01-02 RX ORDER — TRAMADOL HYDROCHLORIDE 50 MG/1
50 TABLET ORAL EVERY 6 HOURS PRN
Qty: 30 TABLET | Refills: 0 | Status: SHIPPED | OUTPATIENT
Start: 2020-01-02 | End: 2020-01-02 | Stop reason: SDUPTHER
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
## 2020-01-06 ENCOUNTER — CLINICAL SUPPORT (OUTPATIENT)
Dept: PRIMARY CARE CLINIC | Facility: CLINIC | Age: 60
End: 2020-01-06
Payer: MEDICARE

## 2020-01-06 DIAGNOSIS — R74.8 ELEVATED ALKALINE PHOSPHATASE LEVEL: ICD-10-CM

## 2020-01-06 DIAGNOSIS — R76.8 HEPATITIS C ANTIBODY TEST POSITIVE: ICD-10-CM

## 2020-01-06 LAB
FOLATE SERPL-MCNC: 11 NG/ML
VIT B12 SERPL-MCNC: 241 PG/ML (ref 180–914)

## 2020-01-06 PROCEDURE — 36415 COLL VENOUS BLD VENIPUNCTURE: CPT | Mod: S$GLB,,, | Performed by: FAMILY MEDICINE

## 2020-01-06 PROCEDURE — 36415 PR COLLECTION VENOUS BLOOD,VENIPUNCTURE: ICD-10-PCS | Mod: S$GLB,,, | Performed by: FAMILY MEDICINE

## 2020-01-06 PROCEDURE — 82607 VITAMIN B-12: CPT

## 2020-01-06 PROCEDURE — 87522 HEPATITIS C REVRS TRNSCRPJ: CPT

## 2020-01-06 PROCEDURE — 82746 ASSAY OF FOLIC ACID SERUM: CPT

## 2020-01-09 LAB
HCV RNA SERPL NAA+PROBE-LOG IU: <1.08 LOG (10) IU/ML
HCV RNA SERPL QL NAA+PROBE: NOT DETECTED IU/ML
HCV RNA SPEC NAA+PROBE-ACNC: <12 IU/ML

## 2020-01-16 ENCOUNTER — OFFICE VISIT (OUTPATIENT)
Dept: ORTHOPEDICS | Facility: CLINIC | Age: 60
End: 2020-01-16
Payer: MEDICARE

## 2020-01-16 VITALS — HEIGHT: 72 IN | WEIGHT: 237.19 LBS | BODY MASS INDEX: 32.13 KG/M2

## 2020-01-16 DIAGNOSIS — M25.511 ACUTE PAIN OF RIGHT SHOULDER: Primary | ICD-10-CM

## 2020-01-16 PROCEDURE — 99999 PR PBB SHADOW E&M-EST. PATIENT-LVL III: ICD-10-PCS | Mod: PBBFAC,,, | Performed by: ORTHOPAEDIC SURGERY

## 2020-01-16 PROCEDURE — 99999 PR PBB SHADOW E&M-EST. PATIENT-LVL III: CPT | Mod: PBBFAC,,, | Performed by: ORTHOPAEDIC SURGERY

## 2020-01-16 PROCEDURE — 20610 LARGE JOINT ASPIRATION/INJECTION: R SUBACROMIAL BURSA: ICD-10-PCS | Mod: RT,S$GLB,, | Performed by: ORTHOPAEDIC SURGERY

## 2020-01-16 PROCEDURE — 20610 DRAIN/INJ JOINT/BURSA W/O US: CPT | Mod: RT,S$GLB,, | Performed by: ORTHOPAEDIC SURGERY

## 2020-01-16 PROCEDURE — 99214 OFFICE O/P EST MOD 30 MIN: CPT | Mod: 25,S$GLB,, | Performed by: ORTHOPAEDIC SURGERY

## 2020-01-16 PROCEDURE — 99214 PR OFFICE/OUTPT VISIT, EST, LEVL IV, 30-39 MIN: ICD-10-PCS | Mod: 25,S$GLB,, | Performed by: ORTHOPAEDIC SURGERY

## 2020-01-16 RX ORDER — MELOXICAM 15 MG/1
15 TABLET ORAL DAILY
Qty: 30 TABLET | Refills: 2 | Status: SHIPPED | OUTPATIENT
Start: 2020-01-16 | End: 2020-05-19

## 2020-01-16 RX ORDER — TRIAMCINOLONE ACETONIDE 40 MG/ML
40 INJECTION, SUSPENSION INTRA-ARTICULAR; INTRAMUSCULAR
Status: DISCONTINUED | OUTPATIENT
Start: 2020-01-16 | End: 2020-01-16 | Stop reason: HOSPADM

## 2020-01-16 RX ADMIN — TRIAMCINOLONE ACETONIDE 40 MG: 40 INJECTION, SUSPENSION INTRA-ARTICULAR; INTRAMUSCULAR at 01:01

## 2020-01-16 NOTE — PROGRESS NOTES
Orthopaedic Surgery History and Physical     History of present illness:   Mauricio Molina is a 59 y.o. male who presents with right shoulder pain status post fall.  Patient has had frequent falls over the last 6 months.  This particular episode patient presented to an outside emergency room where workup demonstrated a mildly displaced distal clavicle fracture.  Patient was treated non operatively.  Since this episode, patient has experienced increasing right shoulder pain.  Reports a dull achy pain localized to the joint with sharp exacerbations with overhead activities pop.  Positive weakness. Positive neck pain. No radiculopathy.  Pain does interfere with his activities of daily living.  To date, patient has tried activity modification and over-the-counter medications with little relief of his symptoms.    Allergies:   Review of patient's allergies indicates:  No Known Allergies    Past medical history:   Past Medical History:   Diagnosis Date    Anxiety     Thyroid disease        Past surgical history:  Past Surgical History:   Procedure Laterality Date    BACK SURGERY      CAROTID STENT      gastric bypass      KNEE SURGERY Right        Social history:   Reviewed per EPIC history for tobacco or alcohol use     Medications:    Current Outpatient Medications:     citalopram (CELEXA) 20 MG tablet, TAKE 1 TABLET BY MOUTH EVERY DAY, Disp: 90 tablet, Rfl: 1    citalopram (CELEXA) 20 MG tablet, Take 1 tablet (20 mg total) by mouth once daily., Disp: 90 tablet, Rfl: 1    clopidogrel (PLAVIX) 75 mg tablet, Take 75 mg by mouth once daily., Disp: , Rfl:     ergocalciferol (ERGOCALCIFEROL) 50,000 unit Cap, Take 1 capsule (50,000 Units total) by mouth every 7 days., Disp: 12 capsule, Rfl: 3    ferrous sulfate (FEOSOL) 325 mg (65 mg iron) Tab tablet, take 1 tablet by oral route 2 times every day with food, Disp: 60 tablet, Rfl: 5    gabapentin (NEURONTIN) 300 MG capsule, Take 1 capsule (300 mg total) by mouth 3  (three) times daily., Disp: 90 capsule, Rfl: 2    ibuprofen (ADVIL,MOTRIN) 800 MG tablet, Take 1 tablet (800 mg total) by mouth 3 (three) times daily., Disp: 30 tablet, Rfl: 5    levothyroxine (SYNTHROID) 88 MCG tablet, Take 1 tablet (88 mcg total) by mouth before breakfast., Disp: 30 tablet, Rfl: 5    loperamide (IMODIUM) 2 mg capsule, One p.o. after each loose bowel movement up to 4 per day best to try and limit to 1 per day as needed, Disp: 30 capsule, Rfl: 1    methylPREDNISolone (MEDROL DOSEPACK) 4 mg tablet, use as directed, Disp: 1 Package, Rfl: 0    meloxicam (MOBIC) 15 MG tablet, Take 1 tablet (15 mg total) by mouth once daily. Do not take with any other NSAIDs Take with a meal, Disp: 30 tablet, Rfl: 2    Review of systems:  Denies chest pain, palpitations, shortness of breath.   Denies excessive thirst, urination or heat or cold intolerance.   Denies nausea, vomiting, melena or hematochezia.    Denies fever, chills, night sweats, weight loss.    Denies dysuria or hematuria.   Denies history of anxiety or depression.   Denies any skin abnormalities or rash.   Denies upper or lower extremity paresthesias or lightheadedness.    Denies cough, shortness of breath or hemoptysis.     Physical Exam:   Vitals:    01/16/20 1333   Weight: 107.6 kg (237 lb 3.4 oz)   Height: 6' (1.829 m)     Awake/alert/oriented x3, No acute distress, Afebrile, Vital signs stable  Normocephalic, Atraumatic  Heart is beating at normal rate  Good inspiratory effort with unlaboured breathing  Abdomen soft/nondistended/nontender    Right upper extremity  Motor intact C5-T1  Sensation intact C5-T2  2+ brachial/radial/ulnar pulses  <2s CR refill to digits  4-/5 supraspinatus 4-/5 infraspinatus  Right shoulder range of motion 170° forward flexion, 170° abduction, 40° external rotation is 0°, internal rotation to the sacrum  Positive Neer's  Positive Albright      Imaging:  Radiographs from October  of the right shoulder demonstrates  minimally displaced distal clavicle fracture.  There is minimal glenohumeral degeneration, type 2 acromion, mild superior elevation of the humeral head and minimal AC arthropathy      Assessment:   59 y.o. male with right shoulder pain    Plan:   Inject right shoulder  New right shoulder x-rays  Right shoulder MRI  Physical therapy  NSAIDs p.r.n., prescription given the patient  Activity as tolerated  Return to clinic in 1 month    Ehsan Wood MD  [facility]

## 2020-01-16 NOTE — PROCEDURES
Large Joint Aspiration/Injection: R subacromial bursa  Date/Time: 1/16/2020 1:15 PM  Performed by: Ehsan Wood MD  Authorized by: Ehsan Wood MD     Consent Done?:  Yes (Verbal)  Indications:  Pain  Procedure site marked: Yes    Timeout: Prior to procedure the correct patient, procedure, and site was verified    Anesthesia  Local anesthesia used  Anesthetic: topical anesthetic    Location:  Shoulder  Site:  R subacromial bursa  Needle size:  22 G  Ultrasonic Guidance for needle placement: No  Approach:  Posterior  Medications:  40 mg triamcinolone acetonide 40 mg/mL  Patient tolerance:  Patient tolerated the procedure well with no immediate complications

## 2020-02-12 ENCOUNTER — OFFICE VISIT (OUTPATIENT)
Dept: PULMONOLOGY | Facility: CLINIC | Age: 60
End: 2020-02-12
Payer: MEDICARE

## 2020-02-12 VITALS
DIASTOLIC BLOOD PRESSURE: 91 MMHG | SYSTOLIC BLOOD PRESSURE: 139 MMHG | HEIGHT: 72 IN | HEART RATE: 75 BPM | RESPIRATION RATE: 18 BRPM | BODY MASS INDEX: 31.4 KG/M2 | OXYGEN SATURATION: 96 % | WEIGHT: 231.81 LBS

## 2020-02-12 DIAGNOSIS — Z72.0 TOBACCO ABUSE: Primary | ICD-10-CM

## 2020-02-12 DIAGNOSIS — J84.10 CALCIFIED GRANULOMA OF LUNG: ICD-10-CM

## 2020-02-12 DIAGNOSIS — R06.09 DOE (DYSPNEA ON EXERTION): ICD-10-CM

## 2020-02-12 DIAGNOSIS — J44.9 CHRONIC OBSTRUCTIVE PULMONARY DISEASE, UNSPECIFIED COPD TYPE: ICD-10-CM

## 2020-02-12 DIAGNOSIS — R93.89 ABNORMAL CT OF THE CHEST: ICD-10-CM

## 2020-02-12 PROCEDURE — 3008F PR BODY MASS INDEX (BMI) DOCUMENTED: ICD-10-PCS | Mod: CPTII,S$GLB,, | Performed by: INTERNAL MEDICINE

## 2020-02-12 PROCEDURE — 99999 PR PBB SHADOW E&M-EST. PATIENT-LVL V: CPT | Mod: PBBFAC,,, | Performed by: INTERNAL MEDICINE

## 2020-02-12 PROCEDURE — 3008F BODY MASS INDEX DOCD: CPT | Mod: CPTII,S$GLB,, | Performed by: INTERNAL MEDICINE

## 2020-02-12 PROCEDURE — 99205 OFFICE O/P NEW HI 60 MIN: CPT | Mod: S$GLB,,, | Performed by: INTERNAL MEDICINE

## 2020-02-12 PROCEDURE — 99999 PR PBB SHADOW E&M-EST. PATIENT-LVL V: ICD-10-PCS | Mod: PBBFAC,,, | Performed by: INTERNAL MEDICINE

## 2020-02-12 PROCEDURE — 99205 PR OFFICE/OUTPT VISIT, NEW, LEVL V, 60-74 MIN: ICD-10-PCS | Mod: S$GLB,,, | Performed by: INTERNAL MEDICINE

## 2020-02-12 NOTE — PATIENT INSTRUCTIONS
Stop smoking.    Try trelegy for copd- once daily - continue if helps    I do not see problem with ct- old infection in yrs past.    Could check blood test for tb.    Could check chest xray next year or do ct chest.  I do not see worrisome spot.
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
## 2020-02-12 NOTE — LETTER
February 12, 2020      Joselito Escamilla MD  [address] [place] [address]           [facility]  [address]  [facility] [address]  Phone: [phone]  Fax: [phone]          Patient: Mauricio Molina   MR Number: [id number]   YOB: 1960   Date of Visit: 2/12/2020       Dear Dr. Joselito Escamilla:    Thank you for referring Mauricio Molina to me for evaluation. Attached you will find relevant portions of my assessment and plan of care.    If you have questions, please do not hesitate to call me. I look forward to following Mauricio Molina along with you.    Sincerely,    Neymar Pelaez MD    Enclosure  CC:  No Recipients    If you would like to receive this communication electronically, please contact [email or web address] or [phone] to request more information on [employer] access.    For providers and/or their staff who would like to refer a patient to [facility], please contact us through our one-stop-shop provider referral line, [facility], at [phone].    If you feel you have received this communication in error or would no longer like to receive these types of communications, please e-mail [email or web address]

## 2020-03-06 ENCOUNTER — TELEPHONE (OUTPATIENT)
Dept: PRIMARY CARE CLINIC | Facility: CLINIC | Age: 60
End: 2020-03-06

## 2020-03-06 NOTE — TELEPHONE ENCOUNTER
----- Message from Rosalina Edwards sent at 3/6/2020 10:47 AM CST -----  Contact: Patient  Type: Needs Medical Advice    Who Called:  Mauricio patient  Symptoms (please be specific):  N/A  How long has patient had these symptoms:  N/A  Pharmacy name and phone #:  N/A  Best Call Back Number: [phone]  Additional Information: Calling to ask that his referral for [facility] and clinicals be sent to them, fax [phone]. Please advise. Thanks.

## 2020-03-09 ENCOUNTER — TELEPHONE (OUTPATIENT)
Dept: PRIMARY CARE CLINIC | Facility: CLINIC | Age: 60
End: 2020-03-09

## 2020-03-09 NOTE — TELEPHONE ENCOUNTER
----- Message from Hero Hartmann sent at 3/9/2020 11:26 AM CDT -----  Contact: pt @ [phone]  Pt returning Helena's call

## 2020-03-12 ENCOUNTER — OFFICE VISIT (OUTPATIENT)
Dept: ORTHOPEDICS | Facility: CLINIC | Age: 60
End: 2020-03-12
Payer: MEDICARE

## 2020-03-12 VITALS
WEIGHT: 234.38 LBS | OXYGEN SATURATION: 99 % | RESPIRATION RATE: 18 BRPM | HEART RATE: 91 BPM | HEIGHT: 72 IN | DIASTOLIC BLOOD PRESSURE: 93 MMHG | SYSTOLIC BLOOD PRESSURE: 142 MMHG | BODY MASS INDEX: 31.74 KG/M2

## 2020-03-12 DIAGNOSIS — S83.242A ACUTE MEDIAL MENISCUS TEAR OF LEFT KNEE, INITIAL ENCOUNTER: Primary | ICD-10-CM

## 2020-03-12 PROBLEM — S83.241A ACUTE MEDIAL MENISCUS TEAR OF RIGHT KNEE: Status: ACTIVE | Noted: 2020-03-12

## 2020-03-12 PROCEDURE — 99999 PR PBB SHADOW E&M-EST. PATIENT-LVL III: CPT | Mod: PBBFAC,,, | Performed by: ORTHOPAEDIC SURGERY

## 2020-03-12 PROCEDURE — 99214 OFFICE O/P EST MOD 30 MIN: CPT | Mod: S$GLB,,, | Performed by: ORTHOPAEDIC SURGERY

## 2020-03-12 PROCEDURE — 99999 PR PBB SHADOW E&M-EST. PATIENT-LVL III: ICD-10-PCS | Mod: PBBFAC,,, | Performed by: ORTHOPAEDIC SURGERY

## 2020-03-12 PROCEDURE — 99214 PR OFFICE/OUTPT VISIT, EST, LEVL IV, 30-39 MIN: ICD-10-PCS | Mod: S$GLB,,, | Performed by: ORTHOPAEDIC SURGERY

## 2020-03-12 PROCEDURE — 3008F BODY MASS INDEX DOCD: CPT | Mod: CPTII,S$GLB,, | Performed by: ORTHOPAEDIC SURGERY

## 2020-03-12 PROCEDURE — 3008F PR BODY MASS INDEX (BMI) DOCUMENTED: ICD-10-PCS | Mod: CPTII,S$GLB,, | Performed by: ORTHOPAEDIC SURGERY

## 2020-03-12 NOTE — PROGRESS NOTES
Subjective:      Patient ID: aMuricio Molina is a 59 y.o. male.    Chief Complaint: Follow-up (mri results)    Patient is a 59-year-old male who presents with acute onset of left knee pain status post fall from standing height.  Reports having a twisting type motion applied to his knee during the fall.  Knee experience swelling and disability of function after the fall and remained that way for several days.  Since the fall he reports mechanical type symptoms with no instability in the knee. Ambulates independently.  Has tried activity modification over-the-counter medications, physical therapy, NSAIDs injection with little relief of his symptoms.  Positive back pain. No radiculopathy.    Patient returns with continued left knee pain despite non operative measures.  MRI results    Follow-up   Associated symptoms include joint swelling. Pertinent negatives include no chest pain, chills, coughing, fever, nausea or vomiting.       Review of Systems   Constitution: Negative for chills and fever.   Cardiovascular: Negative for chest pain and syncope.   Respiratory: Negative for cough and shortness of breath.    Musculoskeletal: Positive for back pain, joint pain and joint swelling.   Gastrointestinal: Negative for nausea and vomiting.   Neurological: Negative for brief paralysis and seizures.   Psychiatric/Behavioral: Negative for altered mental status and hallucinations.         Objective:            General    Constitutional: He is oriented to person, place, and time. He appears well-developed and well-nourished.   HENT:   Head: Normocephalic and atraumatic.   Eyes: Conjunctivae are normal.   Neck: Normal range of motion.   Cardiovascular: Intact distal pulses.    Pulmonary/Chest: Effort normal.   Neurological: He is alert and oriented to person, place, and time.   Psychiatric: He has a normal mood and affect. His behavior is normal. Judgment and thought content normal.     General Musculoskeletal Exam   Gait: antalgic          Left Knee Exam     Inspection   Erythema: absent  Scars: absent  Swelling: absent  Effusion: present  Deformity: absent  Bruising: absent    Tenderness   The patient tender to palpation of the medial joint line.    Range of Motion   Extension: 0   Flexion: 120     Tests   Meniscus   Iliana:  Medial - positive Lateral - positive  Stability Lachman: normal (-1 to 2mm) PCL-Posterior Drawer: normal (0 to 2mm)  MCL - Valgus: normal (0 to 2mm)  LCL - Varus: normal (0 to 2mm)  Patella   Patellar Grind: positive    Other   Sensation: normal    Muscle Strength   Left Lower Extremity   Hip Abduction: 5/5   Quadriceps:  5/5   Hamstrin/5     Vascular Exam       Left Pulses  Dorsalis Pedis:      2+  Posterior Tibial:      2+            Radiographs of the left knee demonstrate mild tricompartmental degeneration with no deformity.  There is no fracture/dislocation    MRI of the left knee demonstrates signal change consistent horizontal tears of posterior body of both medial meniscus.  There is significant chondromalacia the patellofemoral compartment no full-thickness      Assessment:       Encounter Diagnosis   Name Primary?    Acute medial meniscus tear of right knee, initial encounter           Plan:       Mauricio was seen today for follow-up.    Diagnoses and all orders for this visit:    Acute medial meniscus tear of right knee, initial encounter      Inject left knee  MRI left knee pain  Physical therapy referral for outpatient therapy  NSAIDs p.r.n., screw given the patient  Activity as tolerated  Return to clinic in 3 months

## 2020-03-16 RX ORDER — CYCLOBENZAPRINE HCL 10 MG
TABLET ORAL
Qty: 30 TABLET | Refills: 5 | Status: SHIPPED | OUTPATIENT
Start: 2020-03-16 | End: 2020-05-19 | Stop reason: SDUPTHER

## 2020-04-17 ENCOUNTER — PATIENT MESSAGE (OUTPATIENT)
Dept: PRIMARY CARE CLINIC | Facility: CLINIC | Age: 60
End: 2020-04-17

## 2020-04-28 ENCOUNTER — TELEPHONE (OUTPATIENT)
Dept: PRIMARY CARE CLINIC | Facility: CLINIC | Age: 60
End: 2020-04-28

## 2020-04-28 ENCOUNTER — PATIENT MESSAGE (OUTPATIENT)
Dept: PRIMARY CARE CLINIC | Facility: CLINIC | Age: 60
End: 2020-04-28

## 2020-04-28 NOTE — TELEPHONE ENCOUNTER
----- Message from Freya Pereira sent at 4/28/2020 12:40 PM CDT -----  Contact: Patient [phone]  Good Afternoon  I  Scheduled a video appointment but patient want to have blood work done on May 12,2020. I am having a hard time doing this. Would you be able to schedule please ?

## 2020-05-19 ENCOUNTER — OFFICE VISIT (OUTPATIENT)
Dept: PRIMARY CARE CLINIC | Facility: CLINIC | Age: 60
End: 2020-05-19
Payer: MEDICARE

## 2020-05-19 DIAGNOSIS — F32.A DEPRESSION, UNSPECIFIED DEPRESSION TYPE: ICD-10-CM

## 2020-05-19 DIAGNOSIS — S80.02XA CONTUSION OF LEFT KNEE, INITIAL ENCOUNTER: ICD-10-CM

## 2020-05-19 DIAGNOSIS — R74.8 ELEVATED ALKALINE PHOSPHATASE LEVEL: ICD-10-CM

## 2020-05-19 DIAGNOSIS — Z72.0 TOBACCO ABUSE: ICD-10-CM

## 2020-05-19 DIAGNOSIS — J84.10 GRANULOMATOUS LUNG DISEASE: ICD-10-CM

## 2020-05-19 DIAGNOSIS — Z98.1 HISTORY OF LUMBAR FUSION: ICD-10-CM

## 2020-05-19 DIAGNOSIS — E46 PROTEIN-CALORIE MALNUTRITION, UNSPECIFIED SEVERITY: ICD-10-CM

## 2020-05-19 DIAGNOSIS — G89.4 CHRONIC PAIN SYNDROME: ICD-10-CM

## 2020-05-19 DIAGNOSIS — Z95.5 HISTORY OF HEART ARTERY STENT: ICD-10-CM

## 2020-05-19 DIAGNOSIS — L98.9 SKIN LESION: ICD-10-CM

## 2020-05-19 DIAGNOSIS — I25.10 CORONARY ARTERY DISEASE, ANGINA PRESENCE UNSPECIFIED, UNSPECIFIED VESSEL OR LESION TYPE, UNSPECIFIED WHETHER NATIVE OR TRANSPLANTED HEART: ICD-10-CM

## 2020-05-19 DIAGNOSIS — Z90.49 HISTORY OF CHOLECYSTECTOMY: ICD-10-CM

## 2020-05-19 DIAGNOSIS — E03.9 HYPOTHYROIDISM, UNSPECIFIED TYPE: Primary | ICD-10-CM

## 2020-05-19 DIAGNOSIS — S83.241A ACUTE MEDIAL MENISCUS TEAR OF RIGHT KNEE, INITIAL ENCOUNTER: ICD-10-CM

## 2020-05-19 DIAGNOSIS — F41.9 ANXIETY: ICD-10-CM

## 2020-05-19 DIAGNOSIS — E55.9 VITAMIN D DEFICIENCY: ICD-10-CM

## 2020-05-19 DIAGNOSIS — E78.5 BORDERLINE HYPERLIPIDEMIA: ICD-10-CM

## 2020-05-19 DIAGNOSIS — Z98.84 HISTORY OF GASTRIC BYPASS: ICD-10-CM

## 2020-05-19 PROCEDURE — 99214 PR OFFICE/OUTPT VISIT, EST, LEVL IV, 30-39 MIN: ICD-10-PCS | Mod: 95,,, | Performed by: FAMILY MEDICINE

## 2020-05-19 PROCEDURE — 99214 OFFICE O/P EST MOD 30 MIN: CPT | Mod: 95,,, | Performed by: FAMILY MEDICINE

## 2020-05-19 RX ORDER — LOPERAMIDE HYDROCHLORIDE 2 MG/1
CAPSULE ORAL
Qty: 30 CAPSULE | Refills: 1 | Status: SHIPPED | OUTPATIENT
Start: 2020-05-19 | End: 2020-07-26

## 2020-05-19 RX ORDER — IBUPROFEN 800 MG/1
800 TABLET ORAL 3 TIMES DAILY
Qty: 90 TABLET | Refills: 1 | Status: SHIPPED | OUTPATIENT
Start: 2020-05-19 | End: 2020-07-21

## 2020-05-19 RX ORDER — CYCLOBENZAPRINE HCL 10 MG
10 TABLET ORAL 3 TIMES DAILY PRN
Qty: 30 TABLET | Refills: 5 | Status: SHIPPED | OUTPATIENT
Start: 2020-05-19 | End: 2020-07-23 | Stop reason: SDUPTHER

## 2020-05-19 RX ORDER — CLOPIDOGREL BISULFATE 75 MG/1
75 TABLET ORAL DAILY
Qty: 90 TABLET | Refills: 1 | Status: SHIPPED | OUTPATIENT
Start: 2020-05-19 | End: 2020-10-26

## 2020-05-19 RX ORDER — DULOXETIN HYDROCHLORIDE 30 MG/1
30 CAPSULE, DELAYED RELEASE ORAL DAILY
COMMUNITY
End: 2020-05-19 | Stop reason: SDUPTHER

## 2020-05-19 RX ORDER — RAMIPRIL 2.5 MG/1
2.5 CAPSULE ORAL DAILY
Qty: 90 CAPSULE | Refills: 1 | Status: SHIPPED | OUTPATIENT
Start: 2020-05-19 | End: 2021-05-20

## 2020-05-19 RX ORDER — LEVOTHYROXINE SODIUM 88 UG/1
88 TABLET ORAL
Qty: 90 TABLET | Refills: 1 | Status: SHIPPED | OUTPATIENT
Start: 2020-05-19 | End: 2021-04-02

## 2020-05-19 RX ORDER — CITALOPRAM 20 MG/1
20 TABLET, FILM COATED ORAL DAILY
Qty: 90 TABLET | Refills: 1 | Status: SHIPPED | OUTPATIENT
Start: 2020-05-19 | End: 2020-08-04 | Stop reason: SDUPTHER

## 2020-05-19 RX ORDER — FERROUS SULFATE 325(65) MG
TABLET ORAL
Qty: 90 TABLET | Refills: 1 | Status: SHIPPED | OUTPATIENT
Start: 2020-05-19 | End: 2020-07-16

## 2020-05-19 RX ORDER — DULOXETIN HYDROCHLORIDE 30 MG/1
30 CAPSULE, DELAYED RELEASE ORAL DAILY
Qty: 90 CAPSULE | Refills: 1 | Status: SHIPPED | OUTPATIENT
Start: 2020-05-19 | End: 2020-10-28

## 2020-05-19 RX ORDER — ROSUVASTATIN CALCIUM 5 MG/1
5 TABLET, COATED ORAL DAILY
Qty: 90 TABLET | Refills: 1 | Status: SHIPPED | OUTPATIENT
Start: 2020-05-19 | End: 2021-05-20

## 2020-05-19 RX ORDER — ROSUVASTATIN CALCIUM 5 MG/1
5 TABLET, COATED ORAL DAILY
COMMUNITY
End: 2020-05-19 | Stop reason: SDUPTHER

## 2020-05-19 RX ORDER — RAMIPRIL 2.5 MG/1
2.5 CAPSULE ORAL DAILY
COMMUNITY
End: 2020-05-19 | Stop reason: SDUPTHER
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
## 2020-05-20 ENCOUNTER — TELEPHONE (OUTPATIENT)
Dept: PRIMARY CARE CLINIC | Facility: CLINIC | Age: 60
End: 2020-05-20

## 2020-05-20 NOTE — TELEPHONE ENCOUNTER
----- Message from Rosalina Edwards sent at 5/20/2020  2:53 PM CDT -----  Contact: Patient  Type: Needs Medical Advice    Who Called:  francisca Martínez  Symptoms (please be specific):  N/A  How long has patient had these symptoms:  N/A  Pharmacy name and phone #:  N/A  Best Call Back Number: [phone]  Additional Information: Wants a copy of the Pain Management referral. Please advise. Thanks.

## 2020-05-20 NOTE — TELEPHONE ENCOUNTER
Tried contacting patient to let him know that referral had been mailed to him. LVM for patient to return my call

## 2020-07-13 ENCOUNTER — PATIENT MESSAGE (OUTPATIENT)
Dept: ADMINISTRATIVE | Facility: HOSPITAL | Age: 60
End: 2020-07-13

## 2020-07-16 RX ORDER — FERROUS SULFATE 325(65) MG
TABLET ORAL
Qty: 90 TABLET | Refills: 1 | Status: SHIPPED | OUTPATIENT
Start: 2020-07-16

## 2020-07-21 DIAGNOSIS — S80.02XA CONTUSION OF LEFT KNEE, INITIAL ENCOUNTER: ICD-10-CM

## 2020-07-21 RX ORDER — IBUPROFEN 800 MG/1
TABLET ORAL
Qty: 90 TABLET | Refills: 5 | Status: SHIPPED | OUTPATIENT
Start: 2020-07-21 | End: 2021-08-01

## 2020-08-04 ENCOUNTER — OFFICE VISIT (OUTPATIENT)
Dept: PRIMARY CARE CLINIC | Facility: CLINIC | Age: 60
End: 2020-08-04
Payer: MEDICARE

## 2020-08-04 VITALS
HEIGHT: 72 IN | HEART RATE: 97 BPM | OXYGEN SATURATION: 98 % | RESPIRATION RATE: 18 BRPM | SYSTOLIC BLOOD PRESSURE: 132 MMHG | BODY MASS INDEX: 32.43 KG/M2 | WEIGHT: 239.44 LBS | DIASTOLIC BLOOD PRESSURE: 70 MMHG | TEMPERATURE: 98 F

## 2020-08-04 DIAGNOSIS — E03.9 HYPOTHYROIDISM, UNSPECIFIED TYPE: ICD-10-CM

## 2020-08-04 DIAGNOSIS — G89.4 CHRONIC PAIN SYNDROME: ICD-10-CM

## 2020-08-04 DIAGNOSIS — Z95.5 HISTORY OF HEART ARTERY STENT: ICD-10-CM

## 2020-08-04 DIAGNOSIS — Z72.0 TOBACCO ABUSE: ICD-10-CM

## 2020-08-04 DIAGNOSIS — Z98.1 HISTORY OF LUMBAR FUSION: ICD-10-CM

## 2020-08-04 DIAGNOSIS — S40.021A TRAUMATIC ECCHYMOSIS OF MULTIPLE SITES OF RIGHT UPPER ARM, INITIAL ENCOUNTER: Primary | ICD-10-CM

## 2020-08-04 DIAGNOSIS — F32.A DEPRESSION, UNSPECIFIED DEPRESSION TYPE: ICD-10-CM

## 2020-08-04 DIAGNOSIS — E78.5 BORDERLINE HYPERLIPIDEMIA: ICD-10-CM

## 2020-08-04 DIAGNOSIS — F41.9 ANXIETY: ICD-10-CM

## 2020-08-04 DIAGNOSIS — S46.911A STRAIN OF RIGHT SHOULDER, INITIAL ENCOUNTER: ICD-10-CM

## 2020-08-04 DIAGNOSIS — K08.109 EDENTULOUS: ICD-10-CM

## 2020-08-04 DIAGNOSIS — Z98.84 HISTORY OF GASTRIC BYPASS: ICD-10-CM

## 2020-08-04 PROCEDURE — 99214 OFFICE O/P EST MOD 30 MIN: CPT | Mod: S$GLB,,, | Performed by: FAMILY MEDICINE

## 2020-08-04 PROCEDURE — 99999 PR PBB SHADOW E&M-EST. PATIENT-LVL IV: ICD-10-PCS | Mod: PBBFAC,,, | Performed by: FAMILY MEDICINE

## 2020-08-04 PROCEDURE — 99999 PR PBB SHADOW E&M-EST. PATIENT-LVL IV: CPT | Mod: PBBFAC,,, | Performed by: FAMILY MEDICINE

## 2020-08-04 PROCEDURE — 3008F PR BODY MASS INDEX (BMI) DOCUMENTED: ICD-10-PCS | Mod: CPTII,S$GLB,, | Performed by: FAMILY MEDICINE

## 2020-08-04 PROCEDURE — 99214 PR OFFICE/OUTPT VISIT, EST, LEVL IV, 30-39 MIN: ICD-10-PCS | Mod: S$GLB,,, | Performed by: FAMILY MEDICINE

## 2020-08-04 PROCEDURE — 3008F BODY MASS INDEX DOCD: CPT | Mod: CPTII,S$GLB,, | Performed by: FAMILY MEDICINE

## 2020-08-04 RX ORDER — CYCLOBENZAPRINE HCL 10 MG
10 TABLET ORAL 3 TIMES DAILY PRN
Qty: 30 TABLET | Refills: 5 | Status: SHIPPED | OUTPATIENT
Start: 2020-08-04 | End: 2020-09-24

## 2020-08-04 RX ORDER — CITALOPRAM 20 MG/1
20 TABLET, FILM COATED ORAL DAILY
Qty: 90 TABLET | Refills: 1 | Status: SHIPPED | OUTPATIENT
Start: 2020-08-04

## 2020-08-04 RX ORDER — TRAMADOL HYDROCHLORIDE 50 MG/1
50 TABLET ORAL EVERY 6 HOURS PRN
Qty: 30 TABLET | Refills: 0 | Status: SHIPPED | OUTPATIENT
Start: 2020-08-04 | End: 2020-08-14
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
## 2020-08-11 ENCOUNTER — TELEPHONE (OUTPATIENT)
Dept: PRIMARY CARE CLINIC | Facility: CLINIC | Age: 60
End: 2020-08-11

## 2020-08-11 NOTE — TELEPHONE ENCOUNTER
----- Message from Paulette Trevino sent at 8/11/2020  3:06 PM CDT -----  Contact: self/[phone]  Who left a message for the patient: MA  Does patient know what this is regarding:  lab work  Comments: pt would like a call back.     Please advise

## 2020-09-04 ENCOUNTER — OFFICE VISIT (OUTPATIENT)
Dept: PRIMARY CARE CLINIC | Facility: CLINIC | Age: 60
End: 2020-09-04
Payer: MEDICARE

## 2020-09-04 VITALS
WEIGHT: 242.31 LBS | HEART RATE: 73 BPM | RESPIRATION RATE: 17 BRPM | BODY MASS INDEX: 32.82 KG/M2 | HEIGHT: 72 IN | OXYGEN SATURATION: 99 % | SYSTOLIC BLOOD PRESSURE: 110 MMHG | DIASTOLIC BLOOD PRESSURE: 68 MMHG | TEMPERATURE: 98 F

## 2020-09-04 DIAGNOSIS — I25.10 CORONARY ARTERY DISEASE, ANGINA PRESENCE UNSPECIFIED, UNSPECIFIED VESSEL OR LESION TYPE, UNSPECIFIED WHETHER NATIVE OR TRANSPLANTED HEART: ICD-10-CM

## 2020-09-04 DIAGNOSIS — E78.5 BORDERLINE HYPERLIPIDEMIA: ICD-10-CM

## 2020-09-04 DIAGNOSIS — R68.83 CHILLS: ICD-10-CM

## 2020-09-04 DIAGNOSIS — R06.02 SHORTNESS OF BREATH: ICD-10-CM

## 2020-09-04 DIAGNOSIS — E03.9 HYPOTHYROIDISM, UNSPECIFIED TYPE: ICD-10-CM

## 2020-09-04 DIAGNOSIS — Z95.5 HISTORY OF HEART ARTERY STENT: ICD-10-CM

## 2020-09-04 DIAGNOSIS — R05.9 COUGH: ICD-10-CM

## 2020-09-04 DIAGNOSIS — J44.9 CHRONIC OBSTRUCTIVE PULMONARY DISEASE, UNSPECIFIED COPD TYPE: ICD-10-CM

## 2020-09-04 DIAGNOSIS — R11.0 NAUSEA: ICD-10-CM

## 2020-09-04 DIAGNOSIS — J40 BRONCHITIS: Primary | ICD-10-CM

## 2020-09-04 DIAGNOSIS — E55.9 VITAMIN D DEFICIENCY: ICD-10-CM

## 2020-09-04 DIAGNOSIS — J84.10 GRANULOMATOUS LUNG DISEASE: ICD-10-CM

## 2020-09-04 DIAGNOSIS — Z72.0 TOBACCO ABUSE: ICD-10-CM

## 2020-09-04 DIAGNOSIS — J32.9 SINUSITIS, UNSPECIFIED CHRONICITY, UNSPECIFIED LOCATION: ICD-10-CM

## 2020-09-04 DIAGNOSIS — R06.09 DOE (DYSPNEA ON EXERTION): ICD-10-CM

## 2020-09-04 PROCEDURE — 96372 THER/PROPH/DIAG INJ SC/IM: CPT | Mod: S$GLB,,, | Performed by: FAMILY MEDICINE

## 2020-09-04 PROCEDURE — 99999 PR PBB SHADOW E&M-EST. PATIENT-LVL IV: CPT | Mod: PBBFAC,,, | Performed by: FAMILY MEDICINE

## 2020-09-04 PROCEDURE — 99214 OFFICE O/P EST MOD 30 MIN: CPT | Mod: 25,S$GLB,, | Performed by: FAMILY MEDICINE

## 2020-09-04 PROCEDURE — 99999 PR PBB SHADOW E&M-EST. PATIENT-LVL IV: ICD-10-PCS | Mod: PBBFAC,,, | Performed by: FAMILY MEDICINE

## 2020-09-04 PROCEDURE — 99214 PR OFFICE/OUTPT VISIT, EST, LEVL IV, 30-39 MIN: ICD-10-PCS | Mod: 25,S$GLB,, | Performed by: FAMILY MEDICINE

## 2020-09-04 PROCEDURE — 3008F BODY MASS INDEX DOCD: CPT | Mod: CPTII,S$GLB,, | Performed by: FAMILY MEDICINE

## 2020-09-04 PROCEDURE — 96372 PR INJECTION,THERAP/PROPH/DIAG2ST, IM OR SUBCUT: ICD-10-PCS | Mod: S$GLB,,, | Performed by: FAMILY MEDICINE

## 2020-09-04 PROCEDURE — 3008F PR BODY MASS INDEX (BMI) DOCUMENTED: ICD-10-PCS | Mod: CPTII,S$GLB,, | Performed by: FAMILY MEDICINE

## 2020-09-04 RX ORDER — METHYLPREDNISOLONE 4 MG/1
TABLET ORAL
Qty: 1 PACKAGE | Refills: 0 | Status: SHIPPED | OUTPATIENT
Start: 2020-09-04 | End: 2020-10-26

## 2020-09-04 RX ORDER — TRIAMCINOLONE ACETONIDE 40 MG/ML
40 INJECTION, SUSPENSION INTRA-ARTICULAR; INTRAMUSCULAR ONCE
Status: COMPLETED | OUTPATIENT
Start: 2020-09-04 | End: 2020-09-04

## 2020-09-04 RX ORDER — PROMETHAZINE HYDROCHLORIDE AND CODEINE PHOSPHATE 6.25; 1 MG/5ML; MG/5ML
5 SOLUTION ORAL EVERY 6 HOURS PRN
Qty: 180 ML | Refills: 0 | Status: SHIPPED | OUTPATIENT
Start: 2020-09-04 | End: 2020-10-26

## 2020-09-04 RX ORDER — LEVOFLOXACIN 500 MG/1
500 TABLET, FILM COATED ORAL DAILY
Qty: 7 TABLET | Refills: 0 | Status: SHIPPED | OUTPATIENT
Start: 2020-09-04 | End: 2020-09-11

## 2020-09-04 RX ADMIN — TRIAMCINOLONE ACETONIDE 40 MG: 40 INJECTION, SUSPENSION INTRA-ARTICULAR; INTRAMUSCULAR at 04:09

## 2020-09-04 NOTE — PROGRESS NOTES
Verified pt ID using name and . NKDA. Administered triamcinolone acetonide injection 40 mg IM in left VG per physician order using aseptic technique. Aspirated and no blood return noted. Pt tolerated well with no adverse reactions noted.
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
## 2020-09-16 ENCOUNTER — CLINICAL SUPPORT (OUTPATIENT)
Dept: SMOKING CESSATION | Facility: CLINIC | Age: 60
End: 2020-09-16
Payer: COMMERCIAL

## 2020-09-16 ENCOUNTER — TELEPHONE (OUTPATIENT)
Dept: SMOKING CESSATION | Facility: CLINIC | Age: 60
End: 2020-09-16

## 2020-09-16 DIAGNOSIS — F17.200 NICOTINE DEPENDENCE: ICD-10-CM

## 2020-09-16 PROCEDURE — 99404 PR PREVENT COUNSEL,INDIV,60 MIN: ICD-10-PCS | Mod: S$GLB,,,

## 2020-09-16 PROCEDURE — 99404 PREV MED CNSL INDIV APPRX 60: CPT | Mod: S$GLB,,,

## 2020-09-16 RX ORDER — IBUPROFEN 200 MG
1 TABLET ORAL DAILY
Qty: 28 PATCH | Refills: 0 | Status: SHIPPED | OUTPATIENT
Start: 2020-09-16 | End: 2020-10-28 | Stop reason: SDUPTHER

## 2020-09-16 NOTE — PROGRESS NOTES
See Tobacco Cessation Intake Form for patient assessment and recommendations.  Exhaled carbon monoxide level was 21 ppm per Smokerlyzer.

## 2020-09-16 NOTE — Clinical Note
Pt seen at intake today. He currently smokes (40cigs/day. Discussed tobacco cessation medication of 21 x 2 mg nicotine patch QD. Explained that if 2 patches are too strong d/c second patch and let me know. Pt started on rate reduction and wait time of 15 min prior to smoking. Exhaled carbon monoxide level was 21 (0-6 non-smoker). Will see pt back in office in 1 wk.

## 2020-09-16 NOTE — TELEPHONE ENCOUNTER
Spoke with patient's pharmacy regarding his nicotine patches. Pharmacist states only dispensed 7 ea. Bulk of patches will be in by end of week. Explained to patient. Asked patient to use 1 patch QD until Monday. If pharmacy does not have them to let me know and I will reorder some where else.

## 2020-09-30 ENCOUNTER — CLINICAL SUPPORT (OUTPATIENT)
Dept: SMOKING CESSATION | Facility: CLINIC | Age: 60
End: 2020-09-30
Payer: COMMERCIAL

## 2020-09-30 DIAGNOSIS — F17.200 NICOTINE DEPENDENCE: ICD-10-CM

## 2020-09-30 PROCEDURE — 99402 PREV MED CNSL INDIV APPRX 30: CPT | Mod: S$GLB,,,

## 2020-09-30 PROCEDURE — 99402 PR PREVENT COUNSEL,INDIV,30 MIN: ICD-10-PCS | Mod: S$GLB,,,

## 2020-09-30 RX ORDER — VARENICLINE TARTRATE 0.5 (11)-1
KIT ORAL
Qty: 53 TABLET | Refills: 0 | Status: SHIPPED | OUTPATIENT
Start: 2020-09-30 | End: 2020-11-25 | Stop reason: ALTCHOICE

## 2020-09-30 NOTE — Clinical Note
Pt seen in office today. He continues to smoke 15 cigs/day. Pt remains on tobacco cessation medication of 21 mg nicotine patch times 2 QD. No adverse effects/mental changes noted at this time. Pt asked to reduce current smoking rate by 3 cigs/day. Reviewed coping strategies with patient. Asked patient if he wishes to try chantix again. He used in the past, but vivid dreams made him stop. Explained that we could adjust the dose of chantix and he could take it earlier in the day. This may help with dreaming. He agrees to try. Exhaled carbon monoxide level was 12 ppm per Smokerlyzer (0-6 non-smoker). Will see pt back in office in 1 wk.

## 2020-09-30 NOTE — PROGRESS NOTES
Individual Follow-Up Form    9/30/2020    Clinical Status of Patient: Outpatient    Length of Service: 30 minutes    Continuing Medication: yes  Patches    Other Medications: none     Target Symptoms: Withdrawal and medication side effects. The following were rated moderate (3) to severe (4) on TCRS:  · Moderate (3): desire/crave tobacco  · Severe (4): none    Comments:  Pt seen in office today. He continues to smoke 15 cigs/day. Pt remains on tobacco cessation medication of 21 mg nicotine patch times 2 QD. No adverse effects/mental changes noted at this time. Pt asked to reduce current smoking rate by 3 cigs/day. Reviewed coping strategies with patient. Asked patient if he wishes to try chantix again. He used in the past, but vivid dreams made him stop. Explained that we could adjust the dose of chantix and he could take it earlier in the day. This may help with dreaming. He agrees to try. Exhaled carbon monoxide level was 12 ppm per Smokerlyzer (0-6 non-smoker). Will see pt back in office in 1 wk.     Diagnosis: F17.200    Next Visit: 1 week

## 2020-10-07 ENCOUNTER — TELEPHONE (OUTPATIENT)
Dept: PRIMARY CARE CLINIC | Facility: CLINIC | Age: 60
End: 2020-10-07

## 2020-10-07 ENCOUNTER — CLINICAL SUPPORT (OUTPATIENT)
Dept: SMOKING CESSATION | Facility: CLINIC | Age: 60
End: 2020-10-07
Payer: COMMERCIAL

## 2020-10-07 DIAGNOSIS — F17.200 NICOTINE DEPENDENCE: ICD-10-CM

## 2020-10-07 PROCEDURE — 99402 PREV MED CNSL INDIV APPRX 30: CPT | Mod: S$GLB,,,

## 2020-10-07 PROCEDURE — 99402 PR PREVENT COUNSEL,INDIV,30 MIN: ICD-10-PCS | Mod: S$GLB,,,

## 2020-10-07 NOTE — Clinical Note
Pt seen in office today. He continues to smoke 10 cigs/day. Pt remains on tobacco cessation medication of chantix starter pack and 21 mg nicotine patch QD. He states he never received the chantix from [facility] pharmacy. Will call this morning to check up on it for him. No adverse effects/mental changes noted at this time. Pt asked to reduce current smoking rate by 3 cigs/day. Reviewed coping strategies/stress management/habitual behavior with patient. Exhaled carbon monoxide level was 15 ppm per Smokerlyzer (0-6 non-smoker). Will see pt back in office in 1 wk.

## 2020-10-07 NOTE — PROGRESS NOTES
Individual Follow-Up Form    10/7/2020    Clinical Status of Patient: Outpatient    Length of Service: 30 minutes    Continuing Medication: yes  Patches    Other Medications: none     Target Symptoms: Withdrawal and medication side effects. The following were rated moderate (3) to severe (4) on TCRS:  · Moderate (3): desire/crave tobacco  · Severe (4): none    Comments:  Pt seen in office today. He continues to smoke 10 cigs/day. Pt remains on tobacco cessation medication of chantix starter pack and 21 mg nicotine patch QD. He states he never received the chantix from [facility] pharmacy. Will call this morning to check up on it for him. No adverse effects/mental changes noted at this time. Pt asked to reduce current smoking rate by 3 cigs/day. Reviewed coping strategies/stress management/habitual behavior with patient. Exhaled carbon monoxide level was 15 ppm per Smokerlyzer (0-6 non-smoker). Will see pt back in office in 1 wk.     Diagnosis: F17.200    Next Visit: 1 week
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
## 2020-10-07 NOTE — TELEPHONE ENCOUNTER
----- Message from Rosalina Edwards sent at 10/7/2020 11:20 AM CDT -----  Contact: Kelley with [facility] phone [phone] fax [phone]  Kelley with [facility] phone [phone] fax [phone], Calling to inquire about the surgical clearance that was faxed. Please call her. Thanks.

## 2020-10-08 ENCOUNTER — DOCUMENTATION ONLY (OUTPATIENT)
Dept: SMOKING CESSATION | Facility: CLINIC | Age: 60
End: 2020-10-08

## 2020-10-08 NOTE — PROGRESS NOTES
Called [facility] pharmacy to check up on patients chantix prescription. He states he never received it. Pharmacy will check with  and give patient a call to verify phone number and address.

## 2020-10-21 ENCOUNTER — CLINICAL SUPPORT (OUTPATIENT)
Dept: SMOKING CESSATION | Facility: CLINIC | Age: 60
End: 2020-10-21
Payer: COMMERCIAL

## 2020-10-21 DIAGNOSIS — F17.200 NICOTINE DEPENDENCE: ICD-10-CM

## 2020-10-21 PROCEDURE — 99402 PR PREVENT COUNSEL,INDIV,30 MIN: ICD-10-PCS | Mod: S$GLB,,,

## 2020-10-21 PROCEDURE — 99402 PREV MED CNSL INDIV APPRX 30: CPT | Mod: S$GLB,,,

## 2020-10-21 NOTE — Clinical Note
Pt seen in office today. He continues to smoke 6 cigs/day. Pt remains on tobacco cessation medication of chantix starter pack and 21 mg nicotine patch QD. He has started his chantix. Cravings for nicotine are down. No vivid dreaming or nausea. No adverse effects/mental changes noted at this time. Pt asked to reduce current smoking rate by 2 cigs/day. Reviewed coping strategies/stress management/habitual behavior/relapse prevention with patient. Exhaled carbon monoxide level was 10 ppm per Smokerlyzer (0-6 non-smoker). Will see pt back in office in 1 wk

## 2020-10-21 NOTE — PROGRESS NOTES
Individual Follow-Up Form    10/21/2020    Clinical Status of Patient: Outpatient    Length of Service: 30 minutes    Continuing Medication: yes  Chantix or Patches    Other Medications: none     Target Symptoms: Withdrawal and medication side effects. The following were rated moderate (3) to severe (4) on TCRS:  · Moderate (3): crave/desire tobaacco  · Severe (4): none    Comments:  Pt seen in office today. He continues to smoke 6 cigs/day. Pt remains on tobacco cessation medication of chantix starter pack and 21 mg nicotine patch QD. He has started his chantix. Cravings for nicotine are down. No vivid dreaming or nausea. No adverse effects/mental changes noted at this time. Pt asked to reduce current smoking rate by 2 cigs/day. Reviewed coping strategies/stress management/habitual behavior/relapse prevention with patient. Exhaled carbon monoxide level was 10 ppm per Smokerlyzer (0-6 non-smoker). Will see pt back in office in 1 wk.     Diagnosis: F17.200    Next Visit: 1 week

## 2020-10-22 ENCOUNTER — TELEPHONE (OUTPATIENT)
Dept: PRIMARY CARE CLINIC | Facility: CLINIC | Age: 60
End: 2020-10-22

## 2020-10-22 NOTE — TELEPHONE ENCOUNTER
----- Message from Ashly Perez sent at 10/22/2020  1:55 PM CDT -----  Regarding: Pt self Mobile/Home [phone]  Patient would like a call back in regards to him wanting to scheduled a Preop appointment with you please. Patient is having back surgery on 11/03/2020.

## 2020-10-26 ENCOUNTER — OFFICE VISIT (OUTPATIENT)
Dept: PRIMARY CARE CLINIC | Facility: CLINIC | Age: 60
End: 2020-10-26
Payer: COMMERCIAL

## 2020-10-26 VITALS
DIASTOLIC BLOOD PRESSURE: 64 MMHG | RESPIRATION RATE: 18 BRPM | SYSTOLIC BLOOD PRESSURE: 86 MMHG | HEART RATE: 78 BPM | TEMPERATURE: 98 F | WEIGHT: 238.56 LBS | OXYGEN SATURATION: 99 % | BODY MASS INDEX: 32.31 KG/M2 | HEIGHT: 72 IN

## 2020-10-26 DIAGNOSIS — Z72.0 TOBACCO ABUSE: ICD-10-CM

## 2020-10-26 DIAGNOSIS — Z90.49 HISTORY OF CHOLECYSTECTOMY: ICD-10-CM

## 2020-10-26 DIAGNOSIS — E78.5 BORDERLINE HYPERLIPIDEMIA: Primary | ICD-10-CM

## 2020-10-26 DIAGNOSIS — Z98.890 HISTORY OF LEFT COMMON CAROTID ARTERY STENT PLACEMENT: ICD-10-CM

## 2020-10-26 DIAGNOSIS — F32.A DEPRESSION, UNSPECIFIED DEPRESSION TYPE: ICD-10-CM

## 2020-10-26 DIAGNOSIS — Z95.5 HISTORY OF HEART ARTERY STENT: ICD-10-CM

## 2020-10-26 DIAGNOSIS — K08.109 EDENTULOUS: ICD-10-CM

## 2020-10-26 DIAGNOSIS — I25.10 CORONARY ARTERY DISEASE, ANGINA PRESENCE UNSPECIFIED, UNSPECIFIED VESSEL OR LESION TYPE, UNSPECIFIED WHETHER NATIVE OR TRANSPLANTED HEART: ICD-10-CM

## 2020-10-26 DIAGNOSIS — R55 SYNCOPE AND COLLAPSE: ICD-10-CM

## 2020-10-26 DIAGNOSIS — Z98.84 HISTORY OF GASTRIC BYPASS: ICD-10-CM

## 2020-10-26 DIAGNOSIS — G89.4 CHRONIC PAIN SYNDROME: ICD-10-CM

## 2020-10-26 DIAGNOSIS — E03.9 HYPOTHYROIDISM, UNSPECIFIED TYPE: ICD-10-CM

## 2020-10-26 DIAGNOSIS — M17.11 OSTEOARTHRITIS OF RIGHT KNEE, UNSPECIFIED OSTEOARTHRITIS TYPE: ICD-10-CM

## 2020-10-26 DIAGNOSIS — E55.9 VITAMIN D DEFICIENCY: ICD-10-CM

## 2020-10-26 DIAGNOSIS — Z98.1 HISTORY OF LUMBAR FUSION: ICD-10-CM

## 2020-10-26 DIAGNOSIS — F41.9 ANXIETY: ICD-10-CM

## 2020-10-26 DIAGNOSIS — I95.9 HYPOTENSION, UNSPECIFIED HYPOTENSION TYPE: ICD-10-CM

## 2020-10-26 DIAGNOSIS — Z95.828 HISTORY OF LEFT COMMON CAROTID ARTERY STENT PLACEMENT: ICD-10-CM

## 2020-10-26 PROCEDURE — 99214 OFFICE O/P EST MOD 30 MIN: CPT | Mod: S$GLB,,, | Performed by: FAMILY MEDICINE

## 2020-10-26 PROCEDURE — 3008F BODY MASS INDEX DOCD: CPT | Mod: CPTII,S$GLB,, | Performed by: FAMILY MEDICINE

## 2020-10-26 PROCEDURE — 99999 PR PBB SHADOW E&M-EST. PATIENT-LVL IV: ICD-10-PCS | Mod: PBBFAC,,, | Performed by: FAMILY MEDICINE

## 2020-10-26 PROCEDURE — 3008F PR BODY MASS INDEX (BMI) DOCUMENTED: ICD-10-PCS | Mod: CPTII,S$GLB,, | Performed by: FAMILY MEDICINE

## 2020-10-26 PROCEDURE — 99214 PR OFFICE/OUTPT VISIT, EST, LEVL IV, 30-39 MIN: ICD-10-PCS | Mod: S$GLB,,, | Performed by: FAMILY MEDICINE

## 2020-10-26 PROCEDURE — 99999 PR PBB SHADOW E&M-EST. PATIENT-LVL IV: CPT | Mod: PBBFAC,,, | Performed by: FAMILY MEDICINE

## 2020-10-26 RX ORDER — HYDROCODONE BITARTRATE AND ACETAMINOPHEN 5; 325 MG/1; MG/1
1 TABLET ORAL 3 TIMES DAILY
COMMUNITY
Start: 2020-10-10 | End: 2021-05-20
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
## 2020-10-27 ENCOUNTER — TELEPHONE (OUTPATIENT)
Dept: PRIMARY CARE CLINIC | Facility: CLINIC | Age: 60
End: 2020-10-27

## 2020-10-27 NOTE — TELEPHONE ENCOUNTER
Called patient scheduled appointment with cardiologist, nahid Gutierrez at surgery center notified that patient will need cardiologist clearance states understanding

## 2020-10-27 NOTE — TELEPHONE ENCOUNTER
----- Message from Freya Pereira sent at 10/27/2020  9:00 AM CDT -----  Regarding: Medical Clearance  Contact: [phone] @ [facility]  Good Morning  [facility] called to check on patient's medical clearance. Patient had an appointment yesterday.    Please call Ms Benson @ [facility]    Thank you

## 2020-10-28 ENCOUNTER — CLINICAL SUPPORT (OUTPATIENT)
Dept: SMOKING CESSATION | Facility: CLINIC | Age: 60
End: 2020-10-28
Payer: COMMERCIAL

## 2020-10-28 ENCOUNTER — OFFICE VISIT (OUTPATIENT)
Dept: CARDIOLOGY | Facility: CLINIC | Age: 60
End: 2020-10-28
Payer: MEDICARE

## 2020-10-28 VITALS
HEART RATE: 76 BPM | WEIGHT: 237.44 LBS | HEIGHT: 72 IN | SYSTOLIC BLOOD PRESSURE: 117 MMHG | OXYGEN SATURATION: 98 % | BODY MASS INDEX: 32.16 KG/M2 | DIASTOLIC BLOOD PRESSURE: 82 MMHG

## 2020-10-28 DIAGNOSIS — Z72.0 TOBACCO ABUSE: ICD-10-CM

## 2020-10-28 DIAGNOSIS — Z98.84 HISTORY OF GASTRIC BYPASS: ICD-10-CM

## 2020-10-28 DIAGNOSIS — R06.09 DYSPNEA ON EXERTION: ICD-10-CM

## 2020-10-28 DIAGNOSIS — Z87.74 H/O CONGENITAL ATRIAL SEPTAL DEFECT (ASD) REPAIR: ICD-10-CM

## 2020-10-28 DIAGNOSIS — Z98.61 S/P PTCA (PERCUTANEOUS TRANSLUMINAL CORONARY ANGIOPLASTY): Primary | ICD-10-CM

## 2020-10-28 DIAGNOSIS — R00.2 PALPITATIONS: ICD-10-CM

## 2020-10-28 DIAGNOSIS — I95.9 HYPOTENSION, UNSPECIFIED HYPOTENSION TYPE: ICD-10-CM

## 2020-10-28 DIAGNOSIS — E03.9 HYPOTHYROIDISM, UNSPECIFIED TYPE: ICD-10-CM

## 2020-10-28 DIAGNOSIS — R55 SYNCOPE, UNSPECIFIED SYNCOPE TYPE: ICD-10-CM

## 2020-10-28 DIAGNOSIS — G40.909 SEIZURE DISORDER: ICD-10-CM

## 2020-10-28 DIAGNOSIS — G89.4 CHRONIC PAIN SYNDROME: ICD-10-CM

## 2020-10-28 DIAGNOSIS — E78.5 HYPERLIPIDEMIA, UNSPECIFIED HYPERLIPIDEMIA TYPE: ICD-10-CM

## 2020-10-28 DIAGNOSIS — J84.10 GRANULOMATOUS LUNG DISEASE: ICD-10-CM

## 2020-10-28 DIAGNOSIS — F17.200 NICOTINE DEPENDENCE: ICD-10-CM

## 2020-10-28 DIAGNOSIS — I69.319 COGNITIVE DEFICIT DUE TO OLD EMBOLIC STROKE: ICD-10-CM

## 2020-10-28 PROCEDURE — 99402 PR PREVENT COUNSEL,INDIV,30 MIN: ICD-10-PCS | Mod: S$GLB,,,

## 2020-10-28 PROCEDURE — 3008F BODY MASS INDEX DOCD: CPT | Mod: CPTII,S$GLB,, | Performed by: INTERNAL MEDICINE

## 2020-10-28 PROCEDURE — 99999 PR PBB SHADOW E&M-EST. PATIENT-LVL IV: ICD-10-PCS | Mod: PBBFAC,,, | Performed by: INTERNAL MEDICINE

## 2020-10-28 PROCEDURE — 3008F PR BODY MASS INDEX (BMI) DOCUMENTED: ICD-10-PCS | Mod: CPTII,S$GLB,, | Performed by: INTERNAL MEDICINE

## 2020-10-28 PROCEDURE — 99402 PREV MED CNSL INDIV APPRX 30: CPT | Mod: S$GLB,,,

## 2020-10-28 PROCEDURE — 99999 PR PBB SHADOW E&M-EST. PATIENT-LVL IV: CPT | Mod: PBBFAC,,, | Performed by: INTERNAL MEDICINE

## 2020-10-28 PROCEDURE — 99205 OFFICE O/P NEW HI 60 MIN: CPT | Mod: S$GLB,,, | Performed by: INTERNAL MEDICINE

## 2020-10-28 PROCEDURE — 99205 PR OFFICE/OUTPT VISIT, NEW, LEVL V, 60-74 MIN: ICD-10-PCS | Mod: S$GLB,,, | Performed by: INTERNAL MEDICINE

## 2020-10-28 RX ORDER — ASPIRIN 81 MG/1
81 TABLET ORAL DAILY
COMMUNITY

## 2020-10-28 RX ORDER — IBUPROFEN 200 MG
1 TABLET ORAL DAILY
Qty: 28 PATCH | Refills: 0 | Status: SHIPPED | OUTPATIENT
Start: 2020-10-28 | End: 2020-11-25 | Stop reason: SDUPTHER

## 2020-10-28 NOTE — PROGRESS NOTES
Individual Follow-Up Form    10/28/2020    Clinical Status of Patient: Outpatient    Length of Service: 30 minutes    Continuing Medication: yes  Chantix or Patches    Other Medications: none     Target Symptoms: Withdrawal and medication side effects. The following were  rated moderate (3) to severe (4) on TCRS:  · Moderate (3): desire/crave tobacco  · Severe (4): none    Comments:  Pt seen in office today. He continues to smoke 10 cigs/day. Pt remains on tobacco cessation medication of chantix starter pack and  mg nicotine patch QD and 21 mg nicotine lozenge PRN (1-2 per hour in place of cigarettes). No adverse effects/mental changes noted at this time. Pt asked to reduce current smoking rate by 3 cigs/day. Reviewed coping strategies/stress management/habitual behavior/relapse prevention with patient. Exhaled carbon monoxide level was 8 ppm per [facility] (0-6 non-smoker). Will see pt back in office in 1 wk.     Diagnosis: F17.200    Next Visit: 1 week

## 2020-10-28 NOTE — LETTER
October 28, 2020      Joselito Escamilla MD  [address] [place] [address]           [facility] [address]  [address] [place]  [place] [address]  Phone: [phone]  Fax: [phone]          Patient: Mauricio Molina   MR Number: [id number]   YOB: 1960   Date of Visit: 10/28/2020       Dear Dr. Joselito Escamilla:    Thank you for referring Mauricio Molina to me for evaluation. Attached you will find relevant portions of my assessment and plan of care.    If you have questions, please do not hesitate to call me. I look forward to following Mauricio Molina along with you.    Sincerely,    Angel Mims MD    Enclosure  CC:  No Recipients    If you would like to receive this communication electronically, please contact [email or web address] or [phone] to request more information on [facility] Link access.    For providers and/or their staff who would like to refer a patient to [facility], please contact us through our one-stop-shop provider referral line, [facility], at [phone].    If you feel you have received this communication in error or would no longer like to receive these types of communications, please e-mail [email or web address]

## 2020-10-28 NOTE — PROGRESS NOTES
"  Subjective:      Patient ID: Mauricio Molina is a 60 y.o. male.    Chief Complaint: Pre-op Exam    HPI:  Pt is scheduled for insertion of spinal stimulator by Dr Torres..   Pt has had 3 back surgeries and has chronic pain.    Blood pressure was low at recent office visit.    Pt has discontinued his ramipril    Pt has a hx of syncope in 2005.  Pt was diagnosed with seizures and was treated with seizure meds for years but his neurologist moved after [place].    Pt had gastric bypass in 2008 and lost 175 lbs.    Pt states he had a stroke and a hole in the back of his heart.  The stroke left pt with a memory problem.   This diagnosis was made around 2005.  Pt also required a stent in his heart at that time.    Pt used to be a  and went on disability after the stroke and seizure diagnosis    Pt had the hole in his heart repaired percutaneously by a pediatric cardiologist in [place], [place].     Pt was prescribed warfarin for a period of time.    Pt's last seizure was a couple of months ago.    Pt gets disoriented and loses his memory at the time of a seizure.  Pt has fallen with injury associated with his seizures.    Pt c/o shortness of breath walking short distances    Sometimes pt feels like his heart is beating real fast.  "Sometimes it scares me at night"  Sometimes pt awakens with rapid heart beat associated with numbness of his left arm    Review of Systems   Cardiovascular: Positive for dyspnea on exertion, palpitations and syncope (attributed to seizures). Negative for chest pain, claudication, irregular heartbeat, leg swelling, near-syncope and orthopnea.        Past Medical History:   Diagnosis Date    Anxiety     Cancer     skin cancer on face    Coronary artery disease     Hyperlipidemia     Stroke     Syncope and collapse     Thyroid disease         Past Surgical History:   Procedure Laterality Date    BACK SURGERY      CARDIAC CATHETERIZATION      CAROTID STENT      CORONARY " ANGIOPLASTY      gastric bypass      KNEE SURGERY Right     possible ASD repair         Family History   Problem Relation Age of Onset    Diabetes Mother     Stroke Mother     Diabetes Father     Heart attack Father     Heart disease Brother        Social History     Socioeconomic History    Marital status: Single     Spouse name: Not on file    Number of children: Not on file    Years of education: Not on file    Highest education level: Not on file   Occupational History    Not on file   Social Needs    Financial resource strain: Not on file    Food insecurity     Worry: Not on file     Inability: Not on file    Transportation needs     Medical: Not on file     Non-medical: Not on file   Tobacco Use    Smoking status: Current Every Day Smoker     Packs/day: 1.00     Types: Cigarettes    Smokeless tobacco: Never Used   Substance and Sexual Activity    Alcohol use: Never     Frequency: Never    Drug use: Never    Sexual activity: Yes   Lifestyle    Physical activity     Days per week: Not on file     Minutes per session: Not on file    Stress: Not on file   Relationships    Social connections     Talks on phone: Not on file     Gets together: Not on file     Attends Mosque service: Not on file     Active member of club or organization: Not on file     Attends meetings of clubs or organizations: Not on file     Relationship status: Not on file   Other Topics Concern    Not on file   Social History Narrative    Not on file       Current Outpatient Medications on File Prior to Visit   Medication Sig Dispense Refill    aspirin (ECOTRIN) 81 MG EC tablet Take 81 mg by mouth once daily.      citalopram (CELEXA) 20 MG tablet Take 1 tablet (20 mg total) by mouth once daily. 90 tablet 1    cyclobenzaprine (FLEXERIL) 10 MG tablet TAKE 1 TABLET BY MOUTH THREE TIMES A DAY AS NEEDED 30 tablet 5    ergocalciferol (ERGOCALCIFEROL) 50,000 unit Cap TAKE ONE CAPSULE BY MOUTH ONE TIME PER WEEK 12  capsule 3    ferrous sulfate (FEOSOL) 325 mg (65 mg iron) Tab tablet TAKE 1 TABLET BY MOUTH TWICE A DAY WITH FOOD 90 tablet 1    fluticasone-umeclidin-vilanter (TRELEGY ELLIPTA) 100-62.5-25 mcg DsDv Inhale 1 puff into the lungs once daily. 60 each 11    HYDROcodone-acetaminophen (NORCO) 5-325 mg per tablet Take 1 tablet by mouth 3 (three) times daily.      levothyroxine (SYNTHROID) 88 MCG tablet Take 1 tablet (88 mcg total) by mouth before breakfast. 90 tablet 1    loperamide (IMODIUM) 2 mg capsule TAKE 1 CAP. BY MOUTH AFTER LOOSE BOWEL MOVEMENT, UP TO 4 CAP. PER DAY TRY LIMIT TO 1 CAP PER DAY 30 capsule 1    rosuvastatin (CRESTOR) 5 MG tablet Take 1 tablet (5 mg total) by mouth once daily. 90 tablet 1    varenicline (CHANTIX STARTING MONTH BOX) 0.5 mg (11)- 1 mg (42) tablet Follow package directions 53 tablet 0    ibuprofen (ADVIL,MOTRIN) 800 MG tablet TAKE 1 TABLET BY MOUTH THREE TIMES A DAY (Patient not taking: Reported on 10/28/2020) 90 tablet 5    ramipriL (ALTACE) 2.5 MG capsule Take 1 capsule (2.5 mg total) by mouth once daily. 90 capsule 1    [DISCONTINUED] DULoxetine (CYMBALTA) 30 MG capsule Take 1 capsule (30 mg total) by mouth once daily. 90 capsule 1    [DISCONTINUED] nicotine (NICODERM CQ) 21 mg/24 hr Place 1 patch onto the skin once daily. 28 patch 0     No current facility-administered medications on file prior to visit.        Review of patient's allergies indicates:  No Known Allergies  Objective:     Vitals:    10/28/20 0845   BP: 117/82   BP Location: Left arm   Patient Position: Sitting   BP Method: Small (Automatic)   Pulse: 76   SpO2: 98%   Weight: 107.7 kg (237 lb 7 oz)   Height: 6' (1.829 m)        Physical Exam   Constitutional: He is oriented to person, place, and time. He appears well-developed and well-nourished. No distress.   Eyes: No scleral icterus.   Neck: No JVD present. Carotid bruit is not present.   Cardiovascular: Regular rhythm and normal heart sounds. Exam reveals  no gallop and no friction rub.   No murmur heard.  Pulses:       Posterior tibial pulses are 2+ on the right side and 2+ on the left side.   Pulmonary/Chest: Effort normal and breath sounds normal. No respiratory distress.   Abdominal: Soft. He exhibits no abdominal bruit, no pulsatile midline mass and no mass. There is no hepatosplenomegaly. There is no abdominal tenderness.   Musculoskeletal:         General: No edema.   Neurological: He is alert and oriented to person, place, and time.   Skin: Skin is warm and dry. He is not diaphoretic.   Psychiatric: He has a normal mood and affect. His behavior is normal. Judgment and thought content normal.   Vitals reviewed.     ECG: NSR, WNL    Recent CXR: granulomatous lung disease; evidence of ASD percutaneous repair    Lab Visit on 09/04/2020   Component Date Value Ref Range Status    SARS-CoV2 (COVID-19) Qualitative P* 09/04/2020 Not Detected  Not Detected Final    Sodium 09/04/2020 139  136 - 145 mmol/L Final    Potassium 09/04/2020 4.8  3.5 - 5.1 mmol/L Final    Chloride 09/04/2020 104  101 - 111 mmol/L Final    CO2 09/04/2020 26  23 - 29 mmol/L Final    Glucose 09/04/2020 99  74 - 118 mg/dL Final    BUN 09/04/2020 20  8 - 23 mg/dL Final    Creatinine 09/04/2020 1.1  0.5 - 1.4 mg/dL Final    Calcium 09/04/2020 9.0  8.6 - 10.0 mg/dL Final    Total Protein 09/04/2020 8.1  6.0 - 8.4 g/dL Final    Albumin 09/04/2020 4.0  3.5 - 5.2 g/dL Final    Total Bilirubin 09/04/2020 0.6  0.3 - 1.2 mg/dL Final    Alkaline Phosphatase 09/04/2020 159* 38 - 126 U/L Final    AST 09/04/2020 17  15 - 41 U/L Final    ALT 09/04/2020 20  17 - 63 U/L Final    Anion Gap 09/04/2020 9  8 - 16 mmol/L Final    eGFR if African American 09/04/2020 >60.0  >60 mL/min/1.73 m^2 Final    eGFR if non African American 09/04/2020 >60.0  >60 mL/min/1.73 m^2 Final    WBC 09/04/2020 10.00  3.90 - 12.70 K/uL Final    RBC 09/04/2020 4.55* 4.60 - 6.20 M/uL Final    Hemoglobin 09/04/2020 14.5   14.0 - 18.0 g/dL Final    Hematocrit 09/04/2020 43.5  40.0 - 54.0 % Final    MCV 09/04/2020 96  82 - 98 fL Final    MCH 09/04/2020 31.8* 27.0 - 31.0 pg Final    MCHC 09/04/2020 33.3  32.0 - 36.0 g/dL Final    RDW 09/04/2020 14.1  11.5 - 14.5 % Final    Platelets 09/04/2020 316  150 - 350 K/uL Final    MPV 09/04/2020 8.7* 9.2 - 12.9 fL Final    Gran # (ANC) 09/04/2020 7.4  1.8 - 7.7 K/uL Final    Lymph # 09/04/2020 1.5  1.0 - 4.8 K/uL Final    Mono # 09/04/2020 0.8  0.3 - 1.0 K/uL Final    Eos # 09/04/2020 0.3  0.0 - 0.5 K/uL Final    Baso # 09/04/2020 0.10  0.00 - 0.20 K/uL Final    Gran % 09/04/2020 73.7* 38.0 - 73.0 % Final    Lymph % 09/04/2020 14.9* 18.0 - 48.0 % Final    Mono % 09/04/2020 8.0  4.0 - 15.0 % Final    Eosinophil % 09/04/2020 2.8  0.0 - 8.0 % Final    Basophil % 09/04/2020 0.6  0.0 - 1.9 % Final    Differential Method 09/04/2020 Automated   Final   Lab Visit on 08/05/2020   Component Date Value Ref Range Status    WBC 08/05/2020 8.50  3.90 - 12.70 K/uL Final    RBC 08/05/2020 4.51* 4.60 - 6.20 M/uL Final    Hemoglobin 08/05/2020 14.2  14.0 - 18.0 g/dL Final    Hematocrit 08/05/2020 43.0  40.0 - 54.0 % Final    MCV 08/05/2020 96  82 - 98 fL Final    MCH 08/05/2020 31.4* 27.0 - 31.0 pg Final    MCHC 08/05/2020 32.9  32.0 - 36.0 g/dL Final    RDW 08/05/2020 14.2  11.5 - 14.5 % Final    Platelets 08/05/2020 263  150 - 350 K/uL Final    MPV 08/05/2020 8.4* 9.2 - 12.9 fL Final    Gran # (ANC) 08/05/2020 6.7  1.8 - 7.7 K/uL Final    Lymph # 08/05/2020 1.0  1.0 - 4.8 K/uL Final    Mono # 08/05/2020 0.6  0.3 - 1.0 K/uL Final    Eos # 08/05/2020 0.1  0.0 - 0.5 K/uL Final    Baso # 08/05/2020 0.10  0.00 - 0.20 K/uL Final    Gran % 08/05/2020 78.9* 38.0 - 73.0 % Final    Lymph % 08/05/2020 11.2* 18.0 - 48.0 % Final    Mono % 08/05/2020 7.5  4.0 - 15.0 % Final    Eosinophil % 08/05/2020 1.6  0.0 - 8.0 % Final    Basophil % 08/05/2020 0.8  0.0 - 1.9 % Final    Differential  Method 08/05/2020 Automated   Final    Sodium 08/05/2020 137  136 - 145 mmol/L Final    Potassium 08/05/2020 4.0  3.5 - 5.1 mmol/L Final    Chloride 08/05/2020 102  101 - 111 mmol/L Final    CO2 08/05/2020 25  23 - 29 mmol/L Final    Glucose 08/05/2020 92  74 - 118 mg/dL Final    BUN 08/05/2020 20  8 - 23 mg/dL Final    Creatinine 08/05/2020 0.9  0.5 - 1.4 mg/dL Final    Calcium 08/05/2020 8.8  8.6 - 10.0 mg/dL Final    Total Protein 08/05/2020 7.9  6.0 - 8.4 g/dL Final    Albumin 08/05/2020 3.9  3.5 - 5.2 g/dL Final    Total Bilirubin 08/05/2020 0.6  0.3 - 1.2 mg/dL Final    Alkaline Phosphatase 08/05/2020 136* 38 - 126 U/L Final    AST 08/05/2020 20  15 - 41 U/L Final    ALT 08/05/2020 21  17 - 63 U/L Final    Anion Gap 08/05/2020 10  8 - 16 mmol/L Final    eGFR if African American 08/05/2020 >60.0  >60 mL/min/1.73 m^2 Final    eGFR if non African American 08/05/2020 >60.0  >60 mL/min/1.73 m^2 Final    Cholesterol 08/05/2020 149  80 - 200 mg/dL Final    Triglycerides 08/05/2020 70  30 - 150 mg/dL Final    HDL 08/05/2020 60  40 - 75 mg/dL Final    LDL Cholesterol 08/05/2020 75  <100 mg/dL Final    HDL/Cholesterol Ratio 08/05/2020 40.3  20.0 - 50.0 % Final    Total Cholesterol/HDL Ratio 08/05/2020 2.5  2.0 - 5.0 Final    Non-HDL Cholesterol 08/05/2020 89  mg/dL Final    Vit D, 25-Hydroxy 08/05/2020 15* 30 - 96 ng/mL Final    Free T4 08/05/2020 0.84  0.61 - 1.12 ng/dL Final    TSH 08/05/2020 3.01  0.45 - 5.33 uIU/mL Final   Lab Visit on 05/12/2020   Component Date Value Ref Range Status    WBC 05/12/2020 8.50  3.90 - 12.70 K/uL Final    RBC 05/12/2020 4.76  4.60 - 6.20 M/uL Final    Hemoglobin 05/12/2020 14.7  14.0 - 18.0 g/dL Final    Hematocrit 05/12/2020 44.5  40.0 - 54.0 % Final    MCV 05/12/2020 93  82 - 98 fL Final    MCH 05/12/2020 30.9  27.0 - 31.0 pg Final    MCHC 05/12/2020 33.1  32.0 - 36.0 g/dL Final    RDW 05/12/2020 13.5  11.5 - 14.5 % Final    Platelets 05/12/2020  283  150 - 350 K/uL Final    MPV 05/12/2020 8.5* 9.2 - 12.9 fL Final    Gran # (ANC) 05/12/2020 6.3  1.8 - 7.7 K/uL Final    Lymph # 05/12/2020 1.4  1.0 - 4.8 K/uL Final    Mono # 05/12/2020 0.6  0.3 - 1.0 K/uL Final    Eos # 05/12/2020 0.1  0.0 - 0.5 K/uL Final    Baso # 05/12/2020 0.10  0.00 - 0.20 K/uL Final    Gran % 05/12/2020 74.1* 38.0 - 73.0 % Final    Lymph % 05/12/2020 16.4* 18.0 - 48.0 % Final    Mono % 05/12/2020 7.1  4.0 - 15.0 % Final    Eosinophil % 05/12/2020 1.5  0.0 - 8.0 % Final    Basophil % 05/12/2020 0.9  0.0 - 1.9 % Final    Differential Method 05/12/2020 Automated   Final    Sodium 05/12/2020 143  136 - 145 mmol/L Final    Potassium 05/12/2020 4.7  3.5 - 5.1 mmol/L Final    Chloride 05/12/2020 109  101 - 111 mmol/L Final    CO2 05/12/2020 26  23 - 29 mmol/L Final    Glucose 05/12/2020 96  74 - 118 mg/dL Final    BUN 05/12/2020 19  6 - 20 mg/dL Final    Creatinine 05/12/2020 0.9  0.5 - 1.4 mg/dL Final    Calcium 05/12/2020 9.1  8.6 - 10.0 mg/dL Final    Total Protein 05/12/2020 7.5  6.0 - 8.4 g/dL Final    Albumin 05/12/2020 3.6  3.5 - 5.2 g/dL Final    Total Bilirubin 05/12/2020 0.4  0.3 - 1.2 mg/dL Final    Alkaline Phosphatase 05/12/2020 146* 38 - 126 U/L Final    AST 05/12/2020 19  15 - 41 U/L Final    ALT 05/12/2020 26  17 - 63 U/L Final    Anion Gap 05/12/2020 8  8 - 16 mmol/L Final    eGFR if African American 05/12/2020 >60.0  >60 mL/min/1.73 m^2 Final    eGFR if non African American 05/12/2020 >60.0  >60 mL/min/1.73 m^2 Final    TSH 05/12/2020 2.66  0.45 - 5.33 uIU/mL Final    Free T4 05/12/2020 0.67  0.61 - 1.12 ng/dL Final    Cholesterol 05/12/2020 189  80 - 200 mg/dL Final    Triglycerides 05/12/2020 121  30 - 150 mg/dL Final    HDL 05/12/2020 60  40 - 75 mg/dL Final    LDL Cholesterol 05/12/2020 105* <100 mg/dL Final    HDL/Cholesterol Ratio 05/12/2020 31.7  20.0 - 50.0 % Final    Total Cholesterol/HDL Ratio 05/12/2020 3.2  2.0 - 5.0 Final     Non-HDL Cholesterol 05/12/2020 129  mg/dL Final   (    Assessment:     1. S/P PTCA (percutaneous transluminal coronary angioplasty)    2. Palpitations    3. Hyperlipidemia, unspecified hyperlipidemia type    4. Hypotension, unspecified hypotension type    5. Chronic pain syndrome    6. Granulomatous lung disease    7. History of gastric bypass    8. Hypothyroidism, unspecified type    9. Syncope, unspecified syncope type    10. Tobacco abuse    11. Dyspnea on exertion    12. Seizure disorder    13. H/O congenital atrial septal defect (ASD) repair    14. Cognitive deficit due to old embolic stroke      Plan:   Mauricio was seen today for pre-op exam.    Diagnoses and all orders for this visit:    S/P PTCA (percutaneous transluminal coronary angioplasty)  -     NM Myocardial Perfusion Spect Multi Pharmacologic; Future  -     Nuclear Stress Test; Future    Palpitations  -     Holter monitor - 48 hour; Future    Hyperlipidemia, unspecified hyperlipidemia type    Hypotension, unspecified hypotension type    Chronic pain syndrome    Granulomatous lung disease    History of gastric bypass    Hypothyroidism, unspecified type    Syncope, unspecified syncope type    Tobacco abuse    Dyspnea on exertion  -     NM Myocardial Perfusion Spect Multi Pharmacologic; Future  -     Nuclear Stress Test; Future    Seizure disorder    H/O congenital atrial septal defect (ASD) repair  -     Echo Color Flow Doppler? Yes; Future    Cognitive deficit due to old embolic stroke     Pt apparently had an embolic stroke in 2005 which left pt with a seizure disorder.  Pt was apparently found to have an ASD which was repaired percutaneously.  In addition pt apparently was found to have CAD and underwent PTCA.    OK to discontinue the ramipril due to symptomatic orthostatic hypotension    Pt instructed that cyclobenzaprine can also cause orthostatic hypotension    Pt instructed to see neurologist in f/u for hx seizures.  Pt used to take anticonvulsants  for years following his stroke    Will get echocardiogram to f/u ASD repair    Will get Lexiscan Cardiolite stress test to f/u CAD and prior PTCA and dyspnea on exertion    48 hour holter monitor to look for arrhythmia to explain palpitations.  Pt may need an event monitor or even a loop recorder if the etiology of his palpitations remains unclear.    Need records from Dr Gutierrez in [place]  Will try to get records from [facility] in [facility].    Pt is clinically stable and is medically stable for spinal stimulator surgery next week (without completing his cardiac work up first)    Follow up in about 4 weeks (around 11/25/2020).

## 2020-10-28 NOTE — Clinical Note
Pt seen in office today. He continues to smoke 10 cigs/day. Pt remains on tobacco cessation medication of chantix starter pack and  mg nicotine patch QD and 21 mg nicotine lozenge PRN (1-2 per hour in place of cigarettes). No adverse effects/mental changes noted at this time. Pt asked to reduce current smoking rate by 3 cigs/day. Reviewed coping strategies/stress management/habitual behavior/relapse prevention with patient. Exhaled carbon monoxide level was 8 ppm per Smokerlyzer (0-6 non-smoker). Will see pt back in office in 1 wk.

## 2020-10-30 ENCOUNTER — TELEPHONE (OUTPATIENT)
Dept: NEUROLOGY | Facility: CLINIC | Age: 60
End: 2020-10-30

## 2020-11-02 ENCOUNTER — TELEPHONE (OUTPATIENT)
Dept: PRIMARY CARE CLINIC | Facility: CLINIC | Age: 60
End: 2020-11-02

## 2020-11-02 NOTE — TELEPHONE ENCOUNTER
----- Message from Gurpreet More sent at 11/2/2020  9:48 AM CST -----  She requested that you fax the results from the Pre op appointment on 10/26/20 to [phone].    Thank you

## 2020-11-11 ENCOUNTER — CLINICAL SUPPORT (OUTPATIENT)
Dept: SMOKING CESSATION | Facility: CLINIC | Age: 60
End: 2020-11-11
Payer: COMMERCIAL

## 2020-11-11 DIAGNOSIS — F17.200 NICOTINE DEPENDENCE: ICD-10-CM

## 2020-11-11 PROCEDURE — 99402 PREV MED CNSL INDIV APPRX 30: CPT | Mod: S$GLB,,,

## 2020-11-11 PROCEDURE — 99402 PR PREVENT COUNSEL,INDIV,30 MIN: ICD-10-PCS | Mod: S$GLB,,,

## 2020-11-11 NOTE — Clinical Note
Pt seen via phone call today, he missed this mornings appointment. He continues to smoke 3-4 cigs/day. Pt remains on tobacco cessation medication of chantix 1 mg BID and 21 mg nicotine patch QD. He states he missed appointment due to a procedure he had done. He states it helps greatly with his pain. He states his smoking is down due to lack of pain. Encouraged him to make a quit attempt if he feels able. No adverse effects/mental changes noted at this time. Reviewed coping strategies/habitual behavior with patient. Reminded him of next weeks appointment. Will see him then.

## 2020-11-11 NOTE — PROGRESS NOTES
Individual Follow-Up Form    11/11/2020    Clinical Status of Patient: Outpatient    Length of Service: 30 minutes    Continuing Medication: yes  Chantix or Patches    Other Medications: none     Target Symptoms: Withdrawal and medication side effects. The following were rated moderate (3) to severe (4) on TCRS:  · Moderate (3): desire/crave tobacco  · Severe (4): none    Comments:  Pt seen via phone call today, he missed this mornings appointment. He continues to smoke 3-4 cigs/day. Pt remains on tobacco cessation medication of chantix 1 mg BID and 21 mg nicotine patch QD. He states he missed appointment due to a procedure he had done. He states it helps greatly with his pain. He states his smoking is down due to lack of pain. Encouraged him to make a quit attempt if he feels able. No adverse effects/mental changes noted at this time. Reviewed coping strategies/habitual behavior with patient. Reminded him of next weeks appointment. Will see him then.     Diagnosis: F17.200    Next Visit: 1 week

## 2020-11-18 ENCOUNTER — TELEPHONE (OUTPATIENT)
Dept: SMOKING CESSATION | Facility: CLINIC | Age: 60
End: 2020-11-18

## 2020-11-18 ENCOUNTER — CLINICAL SUPPORT (OUTPATIENT)
Dept: SMOKING CESSATION | Facility: CLINIC | Age: 60
End: 2020-11-18
Payer: COMMERCIAL

## 2020-11-18 DIAGNOSIS — F17.200 NICOTINE DEPENDENCE: Primary | ICD-10-CM

## 2020-11-18 PROCEDURE — 99402 PR PREVENT COUNSEL,INDIV,30 MIN: ICD-10-PCS | Mod: S$GLB,,,

## 2020-11-18 PROCEDURE — 99402 PREV MED CNSL INDIV APPRX 30: CPT | Mod: S$GLB,,,

## 2020-11-18 NOTE — TELEPHONE ENCOUNTER
Pt missed appointment with tobacco cessation this morning. Called to check up on his progress. No answer, left message.

## 2020-11-18 NOTE — PROGRESS NOTES
Individual Follow-Up Form    11/18/2020    Clinical Status of Patient: Outpatient    Length of Service: 30 minutes    Continuing Medication: yes  Chantix or Patches    Other Medications: none     Target Symptoms: Withdrawal and medication side effects. The following were rated moderate (3) to severe (4) on TCRS:  · Moderate (3): desire/crave tobacco, itching at patch site  · Severe (4):  none    Comments:  Pt seen via phone call today due to pandemic concerns. Missed appointment today, was at another appointment. Returned my call. He continues to smoke 2 cigs/day. Pt remains on tobacco cessation medication of chantix 1 mg BID and 21 mg nicotine patch QD. He states cravings for nicotine are down. Patches make him itch, asked him to d/c. No other adverse effects/mental changes noted at this time. Pt asked to attempt at quit. Reviewed coping strategies/habitual behavior with patient. Will see pt back in office in 1 wk.     Diagnosis: F17.200    Next Visit: 1 week

## 2020-11-18 NOTE — Clinical Note
Pt seen via phone call today due to pandemic concerns. Missed appointment today, was at another appointment. Returned my call. He continues to smoke 2 cigs/day. Pt remains on tobacco cessation medication of chantix 1 mg BID and 21 mg nicotine patch QD. He states cravings for nicotine are down. Patches make him itch, asked him to d/c. No other adverse effects/mental changes noted at this time. Pt asked to attempt at quit. Reviewed coping strategies/habitual behavior with patient. Will see pt back in office in 1 wk.

## 2020-11-25 ENCOUNTER — CLINICAL SUPPORT (OUTPATIENT)
Dept: SMOKING CESSATION | Facility: CLINIC | Age: 60
End: 2020-11-25
Payer: COMMERCIAL

## 2020-11-25 DIAGNOSIS — F17.200 NICOTINE DEPENDENCE: ICD-10-CM

## 2020-11-25 PROCEDURE — 99402 PREV MED CNSL INDIV APPRX 30: CPT | Mod: S$GLB,,,

## 2020-11-25 PROCEDURE — 99402 PR PREVENT COUNSEL,INDIV,30 MIN: ICD-10-PCS | Mod: S$GLB,,,

## 2020-11-25 RX ORDER — VARENICLINE TARTRATE 1 MG/1
1 TABLET, FILM COATED ORAL 2 TIMES DAILY
Qty: 56 TABLET | Refills: 0 | Status: SHIPPED | OUTPATIENT
Start: 2020-11-25 | End: 2021-05-20

## 2020-11-25 RX ORDER — IBUPROFEN 200 MG
1 TABLET ORAL DAILY
Qty: 28 PATCH | Refills: 0 | Status: SHIPPED | OUTPATIENT
Start: 2020-11-25 | End: 2020-12-07

## 2020-11-25 NOTE — PROGRESS NOTES
Individual Follow-Up Form    11/25/2020    Clinical Status of Patient: Outpatient    Length of Service: 30 minutes    Continuing Medication: yes  Chantix    Other Medications: none     Target Symptoms: Withdrawal and medication side effects. The following were rated moderate (3) to severe (4) on TCRS:  · Moderate (3): desire/crave tobacco  · Severe (4): none    Comments:  Pt seen via phone call today. He called and stated that he slept through alarm this morning and called to let me know why he missed. He states he has been tobacco free since last Friday. Congratulated him on his success and encouraged him to remain strong during Thanksgiving. Pt remains on tobacco cessation medication of chantix 1 mg BID and 21 mg nicotine patch QD. No adverse effects/mental changes noted at this time. Reviewed coping strategies/stress management/relapse prevention with patient. Will see pt back in office in 1 wk.     Diagnosis: F17.200    Next Visit: 1 week

## 2020-11-25 NOTE — Clinical Note
Pt seen via phone call today. He called and stated that he slept through alarm this morning and called to let me know why he missed. He states he has been tobacco free since last Friday. Congratulated him on his success and encouraged him to remain strong during Thanksgiving. Pt remains on tobacco cessation medication of chantix 1 mg BID and 21 mg nicotine patch QD. No adverse effects/mental changes noted at this time. Reviewed coping strategies/stress management/relapse prevention with patient. Will see pt back in office in 1 wk.

## 2020-12-02 ENCOUNTER — CLINICAL SUPPORT (OUTPATIENT)
Dept: SMOKING CESSATION | Facility: CLINIC | Age: 60
End: 2020-12-02
Payer: COMMERCIAL

## 2020-12-02 DIAGNOSIS — F17.200 NICOTINE DEPENDENCE: ICD-10-CM

## 2020-12-02 PROCEDURE — 99402 PREV MED CNSL INDIV APPRX 30: CPT | Mod: S$GLB,,,

## 2020-12-02 PROCEDURE — 99402 PR PREVENT COUNSEL,INDIV,30 MIN: ICD-10-PCS | Mod: S$GLB,,,

## 2020-12-02 NOTE — Clinical Note
Pt seen in office today. He remains tobacco free at this time. Pt remains on tobacco cessation medication of chantix 1 mg BID and 21 mg nicotine patch QD. No adverse effects/mental changes noted at this time. Reviewed habitual behavior/relapse prevention with patient. Exhaled carbon monoxide level was 2 ppm per Smokerlyzer (0-6 non-smoker). Will see pt back in office in 1 wk.

## 2020-12-02 NOTE — PROGRESS NOTES
Individual Follow-Up Form    12/2/2020    Clinical Status of Patient: Outpatient    Length of Service: 30 minutes    Continuing Medication: yes  Chantix or Patches    Other Medications: none     Target Symptoms: Withdrawal and medication side effects. The following were rated moderate (3) to severe (4) on TCRS:  · Moderate (3): desire/crave tobacco  · Severe (4): none    Comments:   Pt seen in office today. He remains tobacco free at this time. Pt remains on tobacco cessation medication of chantix 1 mg BID and 21 mg nicotine patch QD. No adverse effects/mental changes noted at this time. Reviewed habitual behavior/relapse prevention with patient. Exhaled carbon monoxide level was 2 ppm per Smokerlyzer (0-6 non-smoker). Will see pt back in office in 1 wk.     Diagnosis: F17.200    Next Visit: 1 week

## 2020-12-07 ENCOUNTER — CLINICAL SUPPORT (OUTPATIENT)
Dept: PRIMARY CARE CLINIC | Facility: CLINIC | Age: 60
End: 2020-12-07
Payer: COMMERCIAL

## 2020-12-07 DIAGNOSIS — Z23 NEED FOR IMMUNIZATION AGAINST INFLUENZA: Primary | ICD-10-CM

## 2020-12-07 PROCEDURE — 90686 FLU VACCINE (QUAD) GREATER THAN OR EQUAL TO 3YO PRESERVATIVE FREE IM: ICD-10-PCS | Mod: S$GLB,,, | Performed by: FAMILY MEDICINE

## 2020-12-07 PROCEDURE — 90471 IMMUNIZATION ADMIN: CPT | Mod: S$GLB,,, | Performed by: FAMILY MEDICINE

## 2020-12-07 PROCEDURE — 90471 FLU VACCINE (QUAD) GREATER THAN OR EQUAL TO 3YO PRESERVATIVE FREE IM: ICD-10-PCS | Mod: S$GLB,,, | Performed by: FAMILY MEDICINE

## 2020-12-07 PROCEDURE — 99999 PR PBB SHADOW E&M-EST. PATIENT-LVL III: CPT | Mod: PBBFAC,,,

## 2020-12-07 PROCEDURE — 90686 IIV4 VACC NO PRSV 0.5 ML IM: CPT | Mod: S$GLB,,, | Performed by: FAMILY MEDICINE

## 2020-12-07 PROCEDURE — 99999 PR PBB SHADOW E&M-EST. PATIENT-LVL III: ICD-10-PCS | Mod: PBBFAC,,,

## 2020-12-07 NOTE — PROGRESS NOTES
Patietn identified by name and date of birth, allergies  Reviewed, immunization administered by aseptic technique, tolerated well by pt.

## 2020-12-09 ENCOUNTER — CLINICAL SUPPORT (OUTPATIENT)
Dept: SMOKING CESSATION | Facility: CLINIC | Age: 60
End: 2020-12-09
Payer: COMMERCIAL

## 2020-12-09 DIAGNOSIS — F17.200 NICOTINE DEPENDENCE: ICD-10-CM

## 2020-12-09 PROCEDURE — 99402 PREV MED CNSL INDIV APPRX 30: CPT | Mod: S$GLB,,,

## 2020-12-09 PROCEDURE — 99402 PR PREVENT COUNSEL,INDIV,30 MIN: ICD-10-PCS | Mod: S$GLB,,,

## 2020-12-09 NOTE — PROGRESS NOTES
Individual Follow-Up Form    12/9/2020    Clinical Status of Patient: Outpatient    Length of Service: 30 minutes    Continuing Medication: yes  Chantix or Patches    Other Medications: none     Target Symptoms: Withdrawal and medication side effects. The following were rated moderate (3) to severe (4) on TCRS:  · Moderate (3): none  · Severe (4): none    Comments:  Pt seen in office today. He remains tobacco free at this time. Pt remains on tobacco cessation medication of chantix 1 mg BID and 21 mg nicotine patch. No adverse effects/mental changes noted at this time. Reviewed coping strategies/stress management/habitual behavior/relapse prevention with patient. Exhaled carbon monoxide level was 3 ppm per [employer]oker[facility] (0-6 non-smoker). Will see pt back in office in 1 wk. If he continues to be tobacco free will discharge him.    Diagnosis: F17.200    Next Visit: 1 week

## 2020-12-09 NOTE — Clinical Note
Pt seen in office today. He remains tobacco free at this time. Pt remains on tobacco cessation medication of chantix 1 mg BID and 21 mg nicotine patch. No adverse effects/mental changes noted at this time. Reviewed coping strategies/stress management/habitual behavior/relapse prevention with patient. Exhaled carbon monoxide level was 3 ppm per Smokerlyzer (0-6 non-smoker). Will see pt back in office in 1 wk. If he continues to be tobacco free will discharge him.

## 2020-12-16 ENCOUNTER — CLINICAL SUPPORT (OUTPATIENT)
Dept: SMOKING CESSATION | Facility: CLINIC | Age: 60
End: 2020-12-16
Payer: COMMERCIAL

## 2020-12-16 DIAGNOSIS — F17.200 NICOTINE DEPENDENCE: ICD-10-CM

## 2020-12-16 PROCEDURE — 99402 PR PREVENT COUNSEL,INDIV,30 MIN: ICD-10-PCS | Mod: S$GLB,,,

## 2020-12-16 PROCEDURE — 99402 PREV MED CNSL INDIV APPRX 30: CPT | Mod: S$GLB,,,

## 2020-12-16 NOTE — Clinical Note
Pt seen in office today. He remains tobacco free at this time. Pt remains on tobacco cessation medication of chantix 1 mg BID and 21 mg nicotine patch QD. No adverse effects/mental changes noted at this time. Reviewed coping strategies/stress management/habitual behavior/relapse prevention with patient. Exhaled carbon monoxide level was 2 ppm per Smokerlyzer (0-6 non-smoker). Will see pt back in office in 1 wk.

## 2020-12-16 NOTE — PROGRESS NOTES
Individual Follow-Up Form    12/16/2020    Clinical Status of Patient: Outpatient    Length of Service: 30 minutes    Continuing Medication: yes  Chantix or Patches    Other Medications: nnone     Target Symptoms: Withdrawal and medication side effects. The following were  rated moderate (3) to severe (4) on TCRS:  · Moderate (3): desire/crave tobacco  · Severe (4): none    Comments:  Pt seen in office today. He remains tobacco free at this time. Pt remains on tobacco cessation medication of chantix 1 mg BID and 21 mg nicotine patch QD. No adverse effects/mental changes noted at this time. Reviewed coping strategies/stress management/habitual behavior/relapse prevention with patient. Exhaled carbon monoxide level was 2 ppm per Smoker[facility]zer (0-6 non-smoker). Will see pt back in office in 1 wk.     Diagnosis: F17.200    Next Visit: 1 week

## 2021-03-02 ENCOUNTER — TELEPHONE (OUTPATIENT)
Dept: NEUROSURGERY | Facility: CLINIC | Age: 61
End: 2021-03-02

## 2021-03-04 ENCOUNTER — OFFICE VISIT (OUTPATIENT)
Dept: PRIMARY CARE CLINIC | Facility: CLINIC | Age: 61
End: 2021-03-04
Payer: COMMERCIAL

## 2021-03-04 VITALS
DIASTOLIC BLOOD PRESSURE: 80 MMHG | WEIGHT: 232.25 LBS | SYSTOLIC BLOOD PRESSURE: 128 MMHG | HEART RATE: 87 BPM | TEMPERATURE: 98 F | HEIGHT: 72 IN | OXYGEN SATURATION: 99 % | BODY MASS INDEX: 31.46 KG/M2

## 2021-03-04 DIAGNOSIS — J84.10 GRANULOMATOUS LUNG DISEASE: ICD-10-CM

## 2021-03-04 DIAGNOSIS — G40.909 SEIZURE DISORDER: ICD-10-CM

## 2021-03-04 DIAGNOSIS — K08.109 EDENTULOUS: ICD-10-CM

## 2021-03-04 DIAGNOSIS — L57.0 ACTINIC KERATOSIS: ICD-10-CM

## 2021-03-04 DIAGNOSIS — E55.9 VITAMIN D DEFICIENCY: ICD-10-CM

## 2021-03-04 DIAGNOSIS — I25.10 CORONARY ARTERY DISEASE, ANGINA PRESENCE UNSPECIFIED, UNSPECIFIED VESSEL OR LESION TYPE, UNSPECIFIED WHETHER NATIVE OR TRANSPLANTED HEART: ICD-10-CM

## 2021-03-04 DIAGNOSIS — S39.012A LUMBOSACRAL STRAIN, INITIAL ENCOUNTER: ICD-10-CM

## 2021-03-04 DIAGNOSIS — Z95.5 HISTORY OF HEART ARTERY STENT: ICD-10-CM

## 2021-03-04 DIAGNOSIS — Z90.49 HISTORY OF CHOLECYSTECTOMY: ICD-10-CM

## 2021-03-04 DIAGNOSIS — S83.241A ACUTE MEDIAL MENISCUS TEAR OF RIGHT KNEE, INITIAL ENCOUNTER: ICD-10-CM

## 2021-03-04 DIAGNOSIS — R76.8 HEPATITIS C ANTIBODY TEST POSITIVE: ICD-10-CM

## 2021-03-04 DIAGNOSIS — F41.9 ANXIETY: ICD-10-CM

## 2021-03-04 DIAGNOSIS — R00.2 PALPITATIONS: ICD-10-CM

## 2021-03-04 DIAGNOSIS — Z87.891 HISTORY OF TOBACCO ABUSE: ICD-10-CM

## 2021-03-04 DIAGNOSIS — G89.4 CHRONIC PAIN SYNDROME: ICD-10-CM

## 2021-03-04 DIAGNOSIS — F32.A DEPRESSION, UNSPECIFIED DEPRESSION TYPE: ICD-10-CM

## 2021-03-04 DIAGNOSIS — E78.5 HYPERLIPIDEMIA, UNSPECIFIED HYPERLIPIDEMIA TYPE: Primary | ICD-10-CM

## 2021-03-04 DIAGNOSIS — E03.9 HYPOTHYROIDISM, UNSPECIFIED TYPE: ICD-10-CM

## 2021-03-04 PROCEDURE — 99214 OFFICE O/P EST MOD 30 MIN: CPT | Mod: S$GLB,,, | Performed by: FAMILY MEDICINE

## 2021-03-04 PROCEDURE — 99214 PR OFFICE/OUTPT VISIT, EST, LEVL IV, 30-39 MIN: ICD-10-PCS | Mod: S$GLB,,, | Performed by: FAMILY MEDICINE

## 2021-03-04 PROCEDURE — 3008F BODY MASS INDEX DOCD: CPT | Mod: CPTII,S$GLB,, | Performed by: FAMILY MEDICINE

## 2021-03-04 PROCEDURE — 1126F PR PAIN SEVERITY QUANTIFIED, NO PAIN PRESENT: ICD-10-PCS | Mod: S$GLB,,, | Performed by: FAMILY MEDICINE

## 2021-03-04 PROCEDURE — 99999 PR PBB SHADOW E&M-EST. PATIENT-LVL V: CPT | Mod: PBBFAC,,, | Performed by: FAMILY MEDICINE

## 2021-03-04 PROCEDURE — 99999 PR PBB SHADOW E&M-EST. PATIENT-LVL V: ICD-10-PCS | Mod: PBBFAC,,, | Performed by: FAMILY MEDICINE

## 2021-03-04 PROCEDURE — 3008F PR BODY MASS INDEX (BMI) DOCUMENTED: ICD-10-PCS | Mod: CPTII,S$GLB,, | Performed by: FAMILY MEDICINE

## 2021-03-04 PROCEDURE — 1126F AMNT PAIN NOTED NONE PRSNT: CPT | Mod: S$GLB,,, | Performed by: FAMILY MEDICINE

## 2021-03-11 ENCOUNTER — TELEPHONE (OUTPATIENT)
Dept: SMOKING CESSATION | Facility: CLINIC | Age: 61
End: 2021-03-11

## 2021-03-31 ENCOUNTER — IMMUNIZATION (OUTPATIENT)
Dept: PRIMARY CARE CLINIC | Facility: CLINIC | Age: 61
End: 2021-03-31
Payer: MEDICARE

## 2021-03-31 DIAGNOSIS — Z23 NEED FOR VACCINATION: Primary | ICD-10-CM

## 2021-03-31 PROCEDURE — 0031A PR IMMUNIZ ADMIN, SARS-COV-2 COVID-19 VACC, 5X10VP/0.5ML: CPT | Mod: CV19,S$GLB,, | Performed by: INTERNAL MEDICINE

## 2021-03-31 PROCEDURE — 91303 PR SARSCOV2 VAC AD26 .5ML IM: ICD-10-PCS | Mod: S$GLB,,, | Performed by: INTERNAL MEDICINE

## 2021-03-31 PROCEDURE — 91303 PR SARSCOV2 VAC AD26 .5ML IM: CPT | Mod: S$GLB,,, | Performed by: INTERNAL MEDICINE

## 2021-03-31 PROCEDURE — 0031A PR IMMUNIZ ADMIN, SARS-COV-2 COVID-19 VACC, 5X10VP/0.5ML: ICD-10-PCS | Mod: CV19,S$GLB,, | Performed by: INTERNAL MEDICINE

## 2021-04-02 RX ORDER — LEVOTHYROXINE SODIUM 100 UG/1
100 TABLET ORAL
Qty: 30 TABLET | Refills: 11 | Status: SHIPPED | OUTPATIENT
Start: 2021-04-02 | End: 2021-05-20

## 2021-04-15 DIAGNOSIS — J44.9 CHRONIC OBSTRUCTIVE PULMONARY DISEASE: ICD-10-CM

## 2021-04-15 DIAGNOSIS — Z72.0 TOBACCO ABUSE: Primary | ICD-10-CM

## 2021-05-20 ENCOUNTER — OFFICE VISIT (OUTPATIENT)
Dept: PRIMARY CARE CLINIC | Facility: CLINIC | Age: 61
End: 2021-05-20
Payer: MEDICARE

## 2021-05-20 VITALS
RESPIRATION RATE: 18 BRPM | BODY MASS INDEX: 29.82 KG/M2 | HEART RATE: 83 BPM | DIASTOLIC BLOOD PRESSURE: 82 MMHG | TEMPERATURE: 98 F | OXYGEN SATURATION: 98 % | WEIGHT: 220.13 LBS | HEIGHT: 72 IN | SYSTOLIC BLOOD PRESSURE: 128 MMHG

## 2021-05-20 DIAGNOSIS — S83.241A ACUTE MEDIAL MENISCUS TEAR OF RIGHT KNEE, INITIAL ENCOUNTER: ICD-10-CM

## 2021-05-20 DIAGNOSIS — F41.9 ANXIETY: ICD-10-CM

## 2021-05-20 DIAGNOSIS — I25.10 CORONARY ARTERY DISEASE, ANGINA PRESENCE UNSPECIFIED, UNSPECIFIED VESSEL OR LESION TYPE, UNSPECIFIED WHETHER NATIVE OR TRANSPLANTED HEART: ICD-10-CM

## 2021-05-20 DIAGNOSIS — Z90.49 HISTORY OF CHOLECYSTECTOMY: ICD-10-CM

## 2021-05-20 DIAGNOSIS — G89.4 CHRONIC PAIN SYNDROME: ICD-10-CM

## 2021-05-20 DIAGNOSIS — R76.8 HEPATITIS C ANTIBODY TEST POSITIVE: ICD-10-CM

## 2021-05-20 DIAGNOSIS — S39.012A LUMBOSACRAL STRAIN, INITIAL ENCOUNTER: Primary | ICD-10-CM

## 2021-05-20 DIAGNOSIS — J84.10 GRANULOMATOUS LUNG DISEASE: ICD-10-CM

## 2021-05-20 DIAGNOSIS — M17.11 OSTEOARTHRITIS OF RIGHT KNEE, UNSPECIFIED OSTEOARTHRITIS TYPE: ICD-10-CM

## 2021-05-20 DIAGNOSIS — Z98.84 HISTORY OF GASTRIC BYPASS: ICD-10-CM

## 2021-05-20 DIAGNOSIS — E03.9 HYPOTHYROIDISM, UNSPECIFIED TYPE: ICD-10-CM

## 2021-05-20 DIAGNOSIS — Z98.1 HISTORY OF LUMBAR FUSION: ICD-10-CM

## 2021-05-20 DIAGNOSIS — Z95.5 HISTORY OF HEART ARTERY STENT: ICD-10-CM

## 2021-05-20 DIAGNOSIS — E78.5 HYPERLIPIDEMIA, UNSPECIFIED HYPERLIPIDEMIA TYPE: ICD-10-CM

## 2021-05-20 DIAGNOSIS — G40.909 SEIZURE DISORDER: ICD-10-CM

## 2021-05-20 DIAGNOSIS — K08.109 EDENTULOUS: ICD-10-CM

## 2021-05-20 DIAGNOSIS — R00.2 PALPITATIONS: ICD-10-CM

## 2021-05-20 DIAGNOSIS — Z98.61 S/P PTCA (PERCUTANEOUS TRANSLUMINAL CORONARY ANGIOPLASTY): ICD-10-CM

## 2021-05-20 DIAGNOSIS — F32.A DEPRESSION, UNSPECIFIED DEPRESSION TYPE: ICD-10-CM

## 2021-05-20 DIAGNOSIS — I95.9 HYPOTENSION, UNSPECIFIED HYPOTENSION TYPE: ICD-10-CM

## 2021-05-20 DIAGNOSIS — E55.9 VITAMIN D DEFICIENCY: ICD-10-CM

## 2021-05-20 PROCEDURE — 1125F PR PAIN SEVERITY QUANTIFIED, PAIN PRESENT: ICD-10-PCS | Mod: S$GLB,,, | Performed by: FAMILY MEDICINE

## 2021-05-20 PROCEDURE — 1125F AMNT PAIN NOTED PAIN PRSNT: CPT | Mod: S$GLB,,, | Performed by: FAMILY MEDICINE

## 2021-05-20 PROCEDURE — 99214 OFFICE O/P EST MOD 30 MIN: CPT | Mod: S$GLB,,, | Performed by: FAMILY MEDICINE

## 2021-05-20 PROCEDURE — 99214 PR OFFICE/OUTPT VISIT, EST, LEVL IV, 30-39 MIN: ICD-10-PCS | Mod: S$GLB,,, | Performed by: FAMILY MEDICINE

## 2021-05-20 PROCEDURE — 99999 PR PBB SHADOW E&M-EST. PATIENT-LVL IV: ICD-10-PCS | Mod: PBBFAC,,, | Performed by: FAMILY MEDICINE

## 2021-05-20 PROCEDURE — 3008F BODY MASS INDEX DOCD: CPT | Mod: CPTII,S$GLB,, | Performed by: FAMILY MEDICINE

## 2021-05-20 PROCEDURE — 99999 PR PBB SHADOW E&M-EST. PATIENT-LVL IV: CPT | Mod: PBBFAC,,, | Performed by: FAMILY MEDICINE

## 2021-05-20 PROCEDURE — 3008F PR BODY MASS INDEX (BMI) DOCUMENTED: ICD-10-PCS | Mod: CPTII,S$GLB,, | Performed by: FAMILY MEDICINE

## 2021-05-20 RX ORDER — TRAZODONE HYDROCHLORIDE 100 MG/1
TABLET ORAL
Qty: 30 TABLET | Refills: 11 | Status: SHIPPED | OUTPATIENT
Start: 2021-05-20

## 2021-05-20 RX ORDER — LORAZEPAM 1 MG/1
TABLET ORAL
Qty: 60 TABLET | Refills: 2 | Status: SHIPPED | OUTPATIENT
Start: 2021-05-20

## 2021-05-20 RX ORDER — LOPERAMIDE HYDROCHLORIDE 2 MG/1
CAPSULE ORAL
Qty: 30 CAPSULE | Refills: 5 | Status: SHIPPED | OUTPATIENT
Start: 2021-05-20

## 2021-06-04 ENCOUNTER — TELEPHONE (OUTPATIENT)
Dept: PRIMARY CARE CLINIC | Facility: CLINIC | Age: 61
End: 2021-06-04

## 2021-06-07 ENCOUNTER — TELEPHONE (OUTPATIENT)
Dept: PRIMARY CARE CLINIC | Facility: CLINIC | Age: 61
End: 2021-06-07

## 2021-06-10 ENCOUNTER — TELEPHONE (OUTPATIENT)
Dept: PRIMARY CARE CLINIC | Facility: CLINIC | Age: 61
End: 2021-06-10

## 2021-06-14 ENCOUNTER — TELEPHONE (OUTPATIENT)
Dept: PRIMARY CARE CLINIC | Facility: CLINIC | Age: 61
End: 2021-06-14

## 2021-06-16 ENCOUNTER — TELEPHONE (OUTPATIENT)
Dept: PRIMARY CARE CLINIC | Facility: CLINIC | Age: 61
End: 2021-06-16

## 2021-06-22 ENCOUNTER — TELEPHONE (OUTPATIENT)
Dept: PRIMARY CARE CLINIC | Facility: CLINIC | Age: 61
End: 2021-06-22

## 2021-12-16 ENCOUNTER — TELEPHONE (OUTPATIENT)
Dept: SMOKING CESSATION | Facility: CLINIC | Age: 61
End: 2021-12-16
Payer: MEDICARE

## 2024-06-12 ENCOUNTER — PATIENT OUTREACH (OUTPATIENT)
Dept: ADMINISTRATIVE | Facility: HOSPITAL | Age: 64
End: 2024-06-12
Payer: MEDICARE

## 2024-06-12 NOTE — PROGRESS NOTES
[facility] panel list reviewed. Patient not seen by PCP since 5/20/2021.     Dr. Escamilla removed as PCP